# Patient Record
Sex: MALE | Race: WHITE | NOT HISPANIC OR LATINO | URBAN - METROPOLITAN AREA
[De-identification: names, ages, dates, MRNs, and addresses within clinical notes are randomized per-mention and may not be internally consistent; named-entity substitution may affect disease eponyms.]

---

## 2022-04-09 ENCOUNTER — EMERGENCY (EMERGENCY)
Facility: HOSPITAL | Age: 57
LOS: 1 days | Discharge: HOME | End: 2022-04-09
Attending: EMERGENCY MEDICINE | Admitting: EMERGENCY MEDICINE
Payer: COMMERCIAL

## 2022-04-09 VITALS
DIASTOLIC BLOOD PRESSURE: 67 MMHG | HEART RATE: 78 BPM | OXYGEN SATURATION: 99 % | RESPIRATION RATE: 16 BRPM | SYSTOLIC BLOOD PRESSURE: 134 MMHG | TEMPERATURE: 98 F

## 2022-04-09 VITALS
WEIGHT: 210.1 LBS | DIASTOLIC BLOOD PRESSURE: 78 MMHG | HEIGHT: 75 IN | HEART RATE: 84 BPM | RESPIRATION RATE: 17 BRPM | OXYGEN SATURATION: 97 % | TEMPERATURE: 98 F | SYSTOLIC BLOOD PRESSURE: 138 MMHG

## 2022-04-09 DIAGNOSIS — Z86.16 PERSONAL HISTORY OF COVID-19: ICD-10-CM

## 2022-04-09 DIAGNOSIS — R07.89 OTHER CHEST PAIN: ICD-10-CM

## 2022-04-09 DIAGNOSIS — E11.9 TYPE 2 DIABETES MELLITUS WITHOUT COMPLICATIONS: ICD-10-CM

## 2022-04-09 LAB
ALBUMIN SERPL ELPH-MCNC: 4 G/DL — SIGNIFICANT CHANGE UP (ref 3.3–5)
ALP SERPL-CCNC: 43 U/L — SIGNIFICANT CHANGE UP (ref 40–120)
ALT FLD-CCNC: 37 U/L — SIGNIFICANT CHANGE UP (ref 10–45)
ANION GAP SERPL CALC-SCNC: 10 MMOL/L — SIGNIFICANT CHANGE UP (ref 5–17)
APTT BLD: 36.1 SEC — HIGH (ref 27.5–35.5)
AST SERPL-CCNC: 34 U/L — SIGNIFICANT CHANGE UP (ref 10–40)
BASOPHILS # BLD AUTO: 0.04 K/UL — SIGNIFICANT CHANGE UP (ref 0–0.2)
BASOPHILS NFR BLD AUTO: 0.6 % — SIGNIFICANT CHANGE UP (ref 0–2)
BILIRUB SERPL-MCNC: 0.5 MG/DL — SIGNIFICANT CHANGE UP (ref 0.2–1.2)
BUN SERPL-MCNC: 13 MG/DL — SIGNIFICANT CHANGE UP (ref 7–23)
CALCIUM SERPL-MCNC: 9.2 MG/DL — SIGNIFICANT CHANGE UP (ref 8.4–10.5)
CHLORIDE SERPL-SCNC: 104 MMOL/L — SIGNIFICANT CHANGE UP (ref 96–108)
CK MB CFR SERPL CALC: 4.3 NG/ML — SIGNIFICANT CHANGE UP (ref 0–6.7)
CK SERPL-CCNC: 207 U/L — HIGH (ref 30–200)
CO2 SERPL-SCNC: 25 MMOL/L — SIGNIFICANT CHANGE UP (ref 22–31)
CREAT SERPL-MCNC: 0.9 MG/DL — SIGNIFICANT CHANGE UP (ref 0.5–1.3)
EGFR: 101 ML/MIN/1.73M2 — SIGNIFICANT CHANGE UP
EOSINOPHIL # BLD AUTO: 0.16 K/UL — SIGNIFICANT CHANGE UP (ref 0–0.5)
EOSINOPHIL NFR BLD AUTO: 2.4 % — SIGNIFICANT CHANGE UP (ref 0–6)
GLUCOSE SERPL-MCNC: 154 MG/DL — HIGH (ref 70–99)
HCT VFR BLD CALC: 45.3 % — SIGNIFICANT CHANGE UP (ref 39–50)
HGB BLD-MCNC: 15.8 G/DL — SIGNIFICANT CHANGE UP (ref 13–17)
IMM GRANULOCYTES NFR BLD AUTO: 0.3 % — SIGNIFICANT CHANGE UP (ref 0–1.5)
INR BLD: 1.06 — SIGNIFICANT CHANGE UP (ref 0.88–1.16)
LYMPHOCYTES # BLD AUTO: 1.42 K/UL — SIGNIFICANT CHANGE UP (ref 1–3.3)
LYMPHOCYTES # BLD AUTO: 21.3 % — SIGNIFICANT CHANGE UP (ref 13–44)
MCHC RBC-ENTMCNC: 32.2 PG — SIGNIFICANT CHANGE UP (ref 27–34)
MCHC RBC-ENTMCNC: 34.9 GM/DL — SIGNIFICANT CHANGE UP (ref 32–36)
MCV RBC AUTO: 92.3 FL — SIGNIFICANT CHANGE UP (ref 80–100)
MONOCYTES # BLD AUTO: 0.68 K/UL — SIGNIFICANT CHANGE UP (ref 0–0.9)
MONOCYTES NFR BLD AUTO: 10.2 % — SIGNIFICANT CHANGE UP (ref 2–14)
NEUTROPHILS # BLD AUTO: 4.34 K/UL — SIGNIFICANT CHANGE UP (ref 1.8–7.4)
NEUTROPHILS NFR BLD AUTO: 65.2 % — SIGNIFICANT CHANGE UP (ref 43–77)
NRBC # BLD: 0 /100 WBCS — SIGNIFICANT CHANGE UP (ref 0–0)
PLATELET # BLD AUTO: 247 K/UL — SIGNIFICANT CHANGE UP (ref 150–400)
POTASSIUM SERPL-MCNC: 4.3 MMOL/L — SIGNIFICANT CHANGE UP (ref 3.5–5.3)
POTASSIUM SERPL-SCNC: 4.3 MMOL/L — SIGNIFICANT CHANGE UP (ref 3.5–5.3)
PROT SERPL-MCNC: 7 G/DL — SIGNIFICANT CHANGE UP (ref 6–8.3)
PROTHROM AB SERPL-ACNC: 12.6 SEC — SIGNIFICANT CHANGE UP (ref 10.5–13.4)
RBC # BLD: 4.91 M/UL — SIGNIFICANT CHANGE UP (ref 4.2–5.8)
RBC # FLD: 13.3 % — SIGNIFICANT CHANGE UP (ref 10.3–14.5)
SODIUM SERPL-SCNC: 139 MMOL/L — SIGNIFICANT CHANGE UP (ref 135–145)
TROPONIN T SERPL-MCNC: 0.01 NG/ML — SIGNIFICANT CHANGE UP (ref 0–0.01)
WBC # BLD: 6.66 K/UL — SIGNIFICANT CHANGE UP (ref 3.8–10.5)
WBC # FLD AUTO: 6.66 K/UL — SIGNIFICANT CHANGE UP (ref 3.8–10.5)

## 2022-04-09 PROCEDURE — 80053 COMPREHEN METABOLIC PANEL: CPT

## 2022-04-09 PROCEDURE — 84484 ASSAY OF TROPONIN QUANT: CPT

## 2022-04-09 PROCEDURE — 85025 COMPLETE CBC W/AUTO DIFF WBC: CPT

## 2022-04-09 PROCEDURE — 82553 CREATINE MB FRACTION: CPT

## 2022-04-09 PROCEDURE — 93005 ELECTROCARDIOGRAM TRACING: CPT

## 2022-04-09 PROCEDURE — 71046 X-RAY EXAM CHEST 2 VIEWS: CPT | Mod: 26

## 2022-04-09 PROCEDURE — 36415 COLL VENOUS BLD VENIPUNCTURE: CPT

## 2022-04-09 PROCEDURE — 99285 EMERGENCY DEPT VISIT HI MDM: CPT | Mod: 25

## 2022-04-09 PROCEDURE — 93010 ELECTROCARDIOGRAM REPORT: CPT

## 2022-04-09 PROCEDURE — 85610 PROTHROMBIN TIME: CPT

## 2022-04-09 PROCEDURE — 82962 GLUCOSE BLOOD TEST: CPT

## 2022-04-09 PROCEDURE — 82550 ASSAY OF CK (CPK): CPT

## 2022-04-09 PROCEDURE — 85379 FIBRIN DEGRADATION QUANT: CPT

## 2022-04-09 PROCEDURE — 71046 X-RAY EXAM CHEST 2 VIEWS: CPT

## 2022-04-09 PROCEDURE — 85730 THROMBOPLASTIN TIME PARTIAL: CPT

## 2022-04-09 NOTE — ED PROVIDER NOTE - OBJECTIVE STATEMENT
56M nonsmoker, iddm, prior covid19 infection not req hospitalization (12/2021), completed covid19 vaccination, c/o 1w recurrent nightly nonradiating nonpositional nonpleuritic nonexertional L chest tightness and occasional R thigh "twinges." recently returned from [place] (1.5h flight) prior to sx onset. no fever/chills, no ha/dizziness, no uri/cough, no sob, no palpitations, no abd pain/n/v, no diarrhea, no hematochezia/melena, no change in appetite/po intake, no dysuria, no pedal edema/pain/rash, no trauma, no etoh-dpt/ivdu, no phx/fhx acs/mi vs dvt/pe.

## 2022-04-09 NOTE — ED PROVIDER NOTE - PATIENT PORTAL LINK FT
You can access the FollowMyHealth Patient Portal offered by [facility] by registering at the following website: [email or web address]. By joining [employer]’s FollowMyHealth portal, you will also be able to view your health information using other applications (apps) compatible with our system.

## 2022-04-09 NOTE — ED PROVIDER NOTE - NSFOLLOWUPINSTRUCTIONS_ED_ALL_ED_FT
PLEASE FOLLOW-UP WITH CARDIOLOGY IN 1-2 DAYS.    ANY XRAY IMAGING RESULTS GIVEN IN THE EMERGENCY DEPARTMENT ARE PRELIMINARY UNTIL FORMALLY READ BY A RADIOLOGIST. YOU WILL BE CONTACTED IF THERE ARE ANY SIGNIFICANT CHANGES IN THE FINAL READ.    PLEASE RETURN TO THE EMERGENCY DEPARTMENT IF FEVER, CHEST PAIN, SHORTNESS OF BREATH, ABDOMINAL PAIN, VOMITING, OTHER CONCERNING SYMPTOMS.    PLEASE CONTACT DARNELL OROZCO ([facility] EMERGENCY DEPARTMENT CLINICAL REFERRAL COORDINATOR) TO ASSIST IN SCHEDULING YOUR FOLLOW-UP APPOINTMENT.    Monday - Friday 11am-7pm  [phone]  [email or web address]                    CHEST PAIN - Discharge Care           Chest Pain    WHAT YOU NEED TO KNOW:    Chest pain can be caused by a range of conditions, from not serious to life-threatening. Chest pain can be a symptom of a digestive problem, such as acid reflux or a stomach ulcer. An anxiety attack or a strong emotion, such as anger, can also cause chest pain. Infection, inflammation, or a fracture in the bones or cartilage in your chest can cause pain or discomfort. Sometimes chest pain or pressure is caused by poor blood flow to your heart (angina). Chest pain may also be caused by life-threatening conditions such as a heart attack or blood clot in your lungs.    DISCHARGE INSTRUCTIONS:    Call your local emergency number (911 in the [place]) or have someone call if:   •You have any of the following signs of a heart attack: ?Squeezing, pressure, or pain in your chest      ?You may also have any of the following: ?Discomfort or pain in your back, neck, jaw, stomach, or arm      ?Shortness of breath      ?Nausea or vomiting      ?Lightheadedness or a sudden cold sweat            Seek care immediately if:   •You have chest discomfort that gets worse, even with medicine.      •You cough or vomit blood.      •Your bowel movements are black or bloody.      •You cannot stop vomiting, or it hurts to swallow.      Call your doctor if:   •You have questions or concerns about your condition or care.          Medicines:   •Medicines may be given to treat the cause of your chest pain. Examples include pain medicine, anxiety medicine, or medicines to increase blood flow to your heart.      •Do not take certain medicines without asking your healthcare provider first. These include NSAIDs, herbal or vitamin supplements, and hormones, such as estrogen or progestin.      •Take your medicine as directed. Contact your healthcare provider if you think your medicine is not helping or if you have side effects. Tell him or her if you are allergic to any medicine. Keep a list of the medicines, vitamins, and herbs you take. Include the amounts, and when and why you take them. Bring the list or the pill bottles to follow-up visits. Carry your medicine list with you in case of an emergency.      Healthy living tips: If the cause of your chest pain is known, your healthcare provider will give you specific guidelines to follow. The following are general healthy guidelines:  •Do not smoke. Nicotine and other chemicals in cigarettes and cigars can cause lung and heart damage. Ask your healthcare provider for information if you currently smoke and need help to quit. E-cigarettes or smokeless tobacco still contain nicotine. Talk to your healthcare provider before you use these products.      •Choose a variety of healthy foods as often as possible. Include fresh, frozen, or canned fruits and vegetables. Also include low-fat dairy products, fish, chicken (without skin), and lean meats. Your healthcare provider or a dietitian can help you create meal plans. You may need to avoid certain foods or drinks if your pain is caused by a digestion problem.  Healthy Foods           •Lower your sodium (salt) intake. Limit foods that are high in sodium, such as canned foods, salty snacks, and cold cuts. If you add salt when you cook food, do not add more at the table. Choose low-sodium canned foods as much as possible.             •Drink plenty of water every day. Water helps your body to control temperature and blood pressure. Ask your healthcare provider how much water you should drink every day.      •Ask about activity. Your healthcare provider will tell you which activities to limit or avoid. Ask when you can drive, return to work, and have sex. Ask about the best exercise plan for you.      •Maintain a healthy weight. Ask your healthcare provider what a healthy weight is for you. Ask him or her to help you create a safe weight loss plan if you are overweight.      •Ask about vaccines you may need. Your healthcare provider can tell you which vaccines you need, and when to get them. The following vaccines help prevent diseases that can become serious for a person with a heart condition:?The influenza (flu) vaccine is given each year. Get a flu vaccine as soon as recommended, usually in September or October.      ?The pneumonia vaccine is usually given every 5 years. Your healthcare provider may recommend the pneumonia vaccine if you are 65 or older.      ?COVID-19 vaccines are given to adults as a shot in 1 or 2 doses.   Vaccination is recommended for all adults. A booster (additional) dose is also recommended to help your immune system continue to protect against severe COVID-19. The booster can be a different brand of the COVID-19 vaccine than you originally received. The timing for the booster depends on the type of vaccine you received:?1-dose vaccine: The booster is given at least 2 months after you received the vaccine.      ?2-dose vaccine: The booster is given at least 5 to 6 months after the second dose.         Prevent Heart Disease          Follow up with your doctor within 72 hours, or as directed: You may need to return for more tests to find the cause of your chest pain. You may be referred to a specialist, such as a cardiologist or gastroenterologist. Write down your questions so you remember to ask them during your visits.       © Copyright [employer] 2022           back to top                          © Copyright [employer] 2022

## 2022-04-09 NOTE — ED PROVIDER NOTE - CLINICAL SUMMARY MEDICAL DECISION MAKING FREE TEXT BOX
avss. nontoxic. NAD. no systemic sx. no active cp. no acute resp distress. no leukocytosis vs significant anemia vs electrolyte abnl. trop wnl. ekg w/o significant st/t changes. cxr w/o acute focal consol vs ptx vs pulm edema vs widened mediastinum. low risk by wells. ddimer neg. heart score 2. no indication for inpatient hospitalization at this time. will dc w/ outpatient cards fu. strict return precautions. pt agrees w/ plan. questions answered.

## 2022-04-09 NOTE — ED ADULT NURSE NOTE - OBJECTIVE STATEMENT
pt received into spot 17 A&Ox4 amb appears comfortable arrives for eval of intermittent chest pressure and left leg pain x 1 week no sob respirations unlabored b/l radial pulses 12 lead ekg done iv placed labs sent

## 2022-04-09 NOTE — ED PROVIDER NOTE - CARE PROVIDER_API CALL
Mumtaz Trevino)  Internal Medicine  [address], [address] [place] [address], [place] [address]  Phone: [phone]  Fax: [phone]  Follow Up Time: Urgent

## 2022-04-12 PROBLEM — Z00.00 ENCOUNTER FOR PREVENTIVE HEALTH EXAMINATION: Status: ACTIVE | Noted: 2022-04-12

## 2022-04-24 ENCOUNTER — TRANSCRIPTION ENCOUNTER (OUTPATIENT)
Age: 57
End: 2022-04-24

## 2022-05-05 ENCOUNTER — APPOINTMENT (OUTPATIENT)
Dept: HEART AND VASCULAR | Facility: CLINIC | Age: 57
End: 2022-05-05
Payer: COMMERCIAL

## 2022-05-05 VITALS
TEMPERATURE: 98.7 F | RESPIRATION RATE: 14 BRPM | SYSTOLIC BLOOD PRESSURE: 134 MMHG | DIASTOLIC BLOOD PRESSURE: 76 MMHG | HEART RATE: 82 BPM | WEIGHT: 205 LBS | HEIGHT: 75 IN | BODY MASS INDEX: 25.49 KG/M2 | OXYGEN SATURATION: 98 %

## 2022-05-05 DIAGNOSIS — Z78.9 OTHER SPECIFIED HEALTH STATUS: ICD-10-CM

## 2022-05-05 DIAGNOSIS — Z82.3 FAMILY HISTORY OF STROKE: ICD-10-CM

## 2022-05-05 DIAGNOSIS — R07.9 CHEST PAIN, UNSPECIFIED: ICD-10-CM

## 2022-05-05 PROCEDURE — 99204 OFFICE O/P NEW MOD 45 MIN: CPT

## 2022-05-05 NOTE — HISTORY OF PRESENT ILLNESS
[FreeTextEntry1] : here after ER eval for cp\par enzymes and ekg ok\par advised outpt eval\par iddm since age 18\par cp is sharp, central, lasts minutes\par no dyspnea\par ok when he walks/bikes\par has never done stress test

## 2022-05-05 NOTE — DISCUSSION/SUMMARY
[FreeTextEntry1] : cp seen in ER, long standing IDDM\par referred for CCTA\par advised empiric statin, arb- he will discuss with his endocrinologist/pcp

## 2022-06-01 ENCOUNTER — TRANSCRIPTION ENCOUNTER (OUTPATIENT)
Age: 57
End: 2022-06-01

## 2022-06-02 ENCOUNTER — OUTPATIENT (OUTPATIENT)
Dept: OUTPATIENT SERVICES | Facility: HOSPITAL | Age: 57
LOS: 1 days | End: 2022-06-02

## 2022-06-02 ENCOUNTER — RESULT REVIEW (OUTPATIENT)
Age: 57
End: 2022-06-02

## 2022-06-02 ENCOUNTER — APPOINTMENT (OUTPATIENT)
Dept: CT IMAGING | Facility: CLINIC | Age: 57
End: 2022-06-02
Payer: COMMERCIAL

## 2022-06-02 PROCEDURE — 75574 CT ANGIO HRT W/3D IMAGE: CPT | Mod: 26

## 2022-06-07 ENCOUNTER — TRANSCRIPTION ENCOUNTER (OUTPATIENT)
Age: 57
End: 2022-06-07

## 2022-07-04 ENCOUNTER — NON-APPOINTMENT (OUTPATIENT)
Age: 57
End: 2022-07-04

## 2022-07-05 ENCOUNTER — APPOINTMENT (OUTPATIENT)
Dept: PULMONOLOGY | Facility: CLINIC | Age: 57
End: 2022-07-05

## 2022-07-05 VITALS
DIASTOLIC BLOOD PRESSURE: 73 MMHG | WEIGHT: 205 LBS | OXYGEN SATURATION: 98 % | TEMPERATURE: 97.3 F | HEART RATE: 76 BPM | SYSTOLIC BLOOD PRESSURE: 116 MMHG | BODY MASS INDEX: 25.49 KG/M2 | HEIGHT: 75 IN

## 2022-07-05 PROCEDURE — 99204 OFFICE O/P NEW MOD 45 MIN: CPT

## 2022-07-05 RX ORDER — PEN NEEDLE, DIABETIC 32GX 5/32"
32G X 4 MM NEEDLE, DISPOSABLE MISCELLANEOUS
Qty: 200 | Refills: 0 | Status: ACTIVE | COMMUNITY
Start: 2022-02-03

## 2022-07-05 RX ORDER — INSULIN LISPRO 100 [IU]/ML
100 INJECTION, SOLUTION INTRAVENOUS; SUBCUTANEOUS
Qty: 15 | Refills: 0 | Status: ACTIVE | COMMUNITY
Start: 2022-02-03

## 2022-07-05 RX ORDER — INSULIN LISPRO 100 [IU]/ML
100 INJECTION, SOLUTION INTRAVENOUS; SUBCUTANEOUS
Qty: 10 | Refills: 0 | Status: ACTIVE | COMMUNITY
Start: 2021-08-13

## 2022-07-05 RX ORDER — BLOOD SUGAR DIAGNOSTIC
STRIP MISCELLANEOUS
Qty: 300 | Refills: 0 | Status: ACTIVE | COMMUNITY
Start: 2022-02-03

## 2022-07-05 RX ORDER — GLUCAGON 1 MG
1 KIT INJECTION
Qty: 1 | Refills: 0 | Status: ACTIVE | COMMUNITY
Start: 2022-02-03

## 2022-08-15 ENCOUNTER — RESULT REVIEW (OUTPATIENT)
Age: 57
End: 2022-08-15

## 2022-08-15 ENCOUNTER — APPOINTMENT (OUTPATIENT)
Dept: CT IMAGING | Facility: CLINIC | Age: 57
End: 2022-08-15

## 2022-08-15 ENCOUNTER — OUTPATIENT (OUTPATIENT)
Dept: OUTPATIENT SERVICES | Facility: HOSPITAL | Age: 57
LOS: 1 days | End: 2022-08-15

## 2022-08-15 PROCEDURE — 71250 CT THORAX DX C-: CPT | Mod: 26

## 2022-08-17 NOTE — HISTORY OF PRESENT ILLNESS
[TextBox_4] : 07/05/2022: Asked to evaluate patient by Dr You for lung nodules. This was an incidental finding on a cardiac scan as part of a workup for palpitations. No dyspnea - just if for instance running up stairs. No history of lung disease. Never smoker. . [place] and now lives [place]. No history of chest infection or malignancy. Had Covid in Jan, is vaxxed. No cause for the palpitations was determined, and they stopped after he saw Dr You. No rash, joint symptoms, no change in vision. IDDM under control.\par  [ESS] : 4

## 2022-08-17 NOTE — CONSULT LETTER
[( Thank you for referring [unfilled] for consultation for _____ )] : Thank you for referring [unfilled] for consultation for [unfilled] [Please see my note below.] : Please see my note below. [Consult Closing:] : Thank you very much for allowing me to participate in the care of this patient.  If you have any questions, please do not hesitate to contact me. [Sincerely,] : Sincerely, [FreeTextEntry2] : Madan You MD\par [address] [place] [address]\par [place], [place] [address]\par  [FreeTextEntry3] : Angelia Sultana MD, FCCP\par

## 2022-08-17 NOTE — ASSESSMENT
[FreeTextEntry1] : Data reviewed:\par \par CTA cardiac [facility] 6/2022 personally reviewed : there are some tiny ground glass nodules and small calcified nodes\par \par Impression:\par Incidental micronodules on scan w calcified nodes\par Asymptomatic, never smoker\par \par Plan:\par Significance of these findings is uncertain in this asymptomatic nonsmoker but the ddx would include sarcoidosis, which would invoke the possibility that his cardiac symptoms are also due to sarcoid. Will get diagnostic CT chest and then determine if further workup is needed.\par --\par CT chest [facility] 8/2022 personally reviewed - innumerable tiny nodules and mediastinal adenopathy / spoke to him, rec bronch to exclude sarcoid, he is agreeable but needs to sort out his schedule, risks/benefits reviewed and he will call when he is ready to schedule

## 2023-09-18 ENCOUNTER — EMERGENCY (EMERGENCY)
Facility: HOSPITAL | Age: 58
LOS: 1 days | Discharge: HOME | End: 2023-09-18
Attending: STUDENT IN AN ORGANIZED HEALTH CARE EDUCATION/TRAINING PROGRAM | Admitting: STUDENT IN AN ORGANIZED HEALTH CARE EDUCATION/TRAINING PROGRAM
Payer: COMMERCIAL

## 2023-09-18 VITALS
TEMPERATURE: 98 F | HEIGHT: 75 IN | RESPIRATION RATE: 18 BRPM | DIASTOLIC BLOOD PRESSURE: 78 MMHG | HEART RATE: 77 BPM | SYSTOLIC BLOOD PRESSURE: 148 MMHG | OXYGEN SATURATION: 97 % | WEIGHT: 220.02 LBS

## 2023-09-18 DIAGNOSIS — R07.89 OTHER CHEST PAIN: ICD-10-CM

## 2023-09-18 DIAGNOSIS — R00.2 PALPITATIONS: ICD-10-CM

## 2023-09-18 PROCEDURE — 99285 EMERGENCY DEPT VISIT HI MDM: CPT

## 2023-09-19 VITALS
OXYGEN SATURATION: 96 % | TEMPERATURE: 98 F | DIASTOLIC BLOOD PRESSURE: 86 MMHG | SYSTOLIC BLOOD PRESSURE: 132 MMHG | HEART RATE: 67 BPM | RESPIRATION RATE: 18 BRPM

## 2023-09-19 LAB
ALBUMIN SERPL ELPH-MCNC: 4 G/DL — SIGNIFICANT CHANGE UP (ref 3.3–5)
ALP SERPL-CCNC: 39 U/L — LOW (ref 40–120)
ALT FLD-CCNC: 42 U/L — SIGNIFICANT CHANGE UP (ref 10–45)
ANION GAP SERPL CALC-SCNC: 11 MMOL/L — SIGNIFICANT CHANGE UP (ref 5–17)
AST SERPL-CCNC: 42 U/L — HIGH (ref 10–40)
BASOPHILS # BLD AUTO: 0.03 K/UL — SIGNIFICANT CHANGE UP (ref 0–0.2)
BASOPHILS NFR BLD AUTO: 0.4 % — SIGNIFICANT CHANGE UP (ref 0–2)
BILIRUB SERPL-MCNC: 0.5 MG/DL — SIGNIFICANT CHANGE UP (ref 0.2–1.2)
BUN SERPL-MCNC: 12 MG/DL — SIGNIFICANT CHANGE UP (ref 7–23)
CALCIUM SERPL-MCNC: 9.2 MG/DL — SIGNIFICANT CHANGE UP (ref 8.4–10.5)
CHLORIDE SERPL-SCNC: 103 MMOL/L — SIGNIFICANT CHANGE UP (ref 96–108)
CO2 SERPL-SCNC: 24 MMOL/L — SIGNIFICANT CHANGE UP (ref 22–31)
CREAT SERPL-MCNC: 0.72 MG/DL — SIGNIFICANT CHANGE UP (ref 0.5–1.3)
EGFR: 107 ML/MIN/1.73M2 — SIGNIFICANT CHANGE UP
EOSINOPHIL # BLD AUTO: 0.3 K/UL — SIGNIFICANT CHANGE UP (ref 0–0.5)
EOSINOPHIL NFR BLD AUTO: 4.1 % — SIGNIFICANT CHANGE UP (ref 0–6)
GLUCOSE SERPL-MCNC: 113 MG/DL — HIGH (ref 70–99)
HCT VFR BLD CALC: 43 % — SIGNIFICANT CHANGE UP (ref 39–50)
HGB BLD-MCNC: 15.2 G/DL — SIGNIFICANT CHANGE UP (ref 13–17)
IMM GRANULOCYTES NFR BLD AUTO: 0.3 % — SIGNIFICANT CHANGE UP (ref 0–0.9)
LYMPHOCYTES # BLD AUTO: 2.09 K/UL — SIGNIFICANT CHANGE UP (ref 1–3.3)
LYMPHOCYTES # BLD AUTO: 28.6 % — SIGNIFICANT CHANGE UP (ref 13–44)
MCHC RBC-ENTMCNC: 32.7 PG — SIGNIFICANT CHANGE UP (ref 27–34)
MCHC RBC-ENTMCNC: 35.3 GM/DL — SIGNIFICANT CHANGE UP (ref 32–36)
MCV RBC AUTO: 92.5 FL — SIGNIFICANT CHANGE UP (ref 80–100)
MONOCYTES # BLD AUTO: 0.85 K/UL — SIGNIFICANT CHANGE UP (ref 0–0.9)
MONOCYTES NFR BLD AUTO: 11.6 % — SIGNIFICANT CHANGE UP (ref 2–14)
NEUTROPHILS # BLD AUTO: 4.03 K/UL — SIGNIFICANT CHANGE UP (ref 1.8–7.4)
NEUTROPHILS NFR BLD AUTO: 55 % — SIGNIFICANT CHANGE UP (ref 43–77)
NRBC # BLD: 0 /100 WBCS — SIGNIFICANT CHANGE UP (ref 0–0)
PLATELET # BLD AUTO: 195 K/UL — SIGNIFICANT CHANGE UP (ref 150–400)
POTASSIUM SERPL-MCNC: 4 MMOL/L — SIGNIFICANT CHANGE UP (ref 3.5–5.3)
POTASSIUM SERPL-SCNC: 4 MMOL/L — SIGNIFICANT CHANGE UP (ref 3.5–5.3)
PROT SERPL-MCNC: 6.8 G/DL — SIGNIFICANT CHANGE UP (ref 6–8.3)
RBC # BLD: 4.65 M/UL — SIGNIFICANT CHANGE UP (ref 4.2–5.8)
RBC # FLD: 12.8 % — SIGNIFICANT CHANGE UP (ref 10.3–14.5)
SODIUM SERPL-SCNC: 138 MMOL/L — SIGNIFICANT CHANGE UP (ref 135–145)
TROPONIN T, HIGH SENSITIVITY RESULT: 8 NG/L — SIGNIFICANT CHANGE UP (ref 0–51)
WBC # BLD: 7.32 K/UL — SIGNIFICANT CHANGE UP (ref 3.8–10.5)
WBC # FLD AUTO: 7.32 K/UL — SIGNIFICANT CHANGE UP (ref 3.8–10.5)

## 2023-09-19 PROCEDURE — 99285 EMERGENCY DEPT VISIT HI MDM: CPT | Mod: 25

## 2023-09-19 PROCEDURE — 80053 COMPREHEN METABOLIC PANEL: CPT

## 2023-09-19 PROCEDURE — 71045 X-RAY EXAM CHEST 1 VIEW: CPT | Mod: 26

## 2023-09-19 PROCEDURE — 84484 ASSAY OF TROPONIN QUANT: CPT

## 2023-09-19 PROCEDURE — 85025 COMPLETE CBC W/AUTO DIFF WBC: CPT

## 2023-09-19 PROCEDURE — 36415 COLL VENOUS BLD VENIPUNCTURE: CPT

## 2023-09-19 PROCEDURE — 71045 X-RAY EXAM CHEST 1 VIEW: CPT

## 2023-09-19 NOTE — ED PROVIDER NOTE - INTERNATIONAL TRAVEL
08/08/22 1512   Safe Environment   Safety Measures   (virtual safety rounds complete)   Virtual nurse rounds, patient sitting on edge of bed. Denies pain or needs at this time. No safety concerns identified. Call light within reach. Will continue to monitor. No

## 2023-09-19 NOTE — ED PROVIDER NOTE - PATIENT PORTAL LINK FT
You can access the FollowMyHealth Patient Portal offered by [facility] by registering at the following website: [email or web address]. By joining [employer]’s FollowMyHealth portal, you will also be able to view your health information using other applications (apps) compatible with our system.

## 2023-09-19 NOTE — ED PROVIDER NOTE - PHYSICAL EXAMINATION
General: Awake, alert and oriented. No acute distress. Well developed, hydrated and nourished. Appears stated age.  Skin: Skin in warm, dry and intact without rashes or lesions. Appropriate color for ethnicity  HENMT: head normocephalic and atraumatic; bilateral external ears without swelling. no nasal discharge. moist oral mucosa. supple neck, trachea midline  EYES: Conjunctiva clear. nonicteric sclera. EOM intact, Eyelids are normal in appearance without swelling or lesions.  Cardiac: well perfused, s1, s2, rrr  Respiratory: breathing comfortably on room air. no audible wheezing or stridor, lungs ctab, no chest wall tenderness to palpation  Abdominal: nondistended, soft, nontender  MSK: Neck and back are without deformity, visible external skin changes, or signs of trauma. Curvature of the cervical, thoracic, and lumbar spine are within normal limits. no external signs of trauma. no apparent deficits in ROM of any extremity. no leg swelling or tenderness  Neurological: The patient is awake, alert and oriented to person, place, and time with normal speech. CN 2-12 grossly intact. no apparent deficits. Memory is normal and thought process is intact.  Psychiatric: Appropriate mood and affect. Good judgement and insight.

## 2023-09-19 NOTE — ED PROVIDER NOTE - CLINICAL SUMMARY MEDICAL DECISION MAKING FREE TEXT BOX
The patient's risk factors for ACS were reviewed as well as the EKG.  cxr negative for Pneumonia, Pneumothorax, Esophageal Tears.  no clinical signs of DVT, PERC/wells criteria not indicative of PE.  There are no signs of Pericarditis, Endocarditis, or Myocarditis based on risk factor analysis.  There is no fever.  There does not appear to be an Aortic Dissection either based on history, physical exam, and signs. ekg and troponin wnl. will dc

## 2023-09-19 NOTE — ED ADULT NURSE NOTE - OBJECTIVE STATEMENT
Pt presents ambulatory from triage with c/o "chest pain", non radiating, localized to left anterior chest wall since yesterday. Pt reports onset of pain noted while "working on computer", was "light at first", then repors pain has continued causing him to not be able to go to sleep tonight. Denies any recent fevers. Hx of DM (has insulin pump). Denies any palpitations, nausea SOB, diaphoresis or other associated s/s. Respiratory effort WNL. EKG done in triage and reviewed by ER physician.

## 2023-09-19 NOTE — ED ADULT NURSE NOTE - SKIN TURGOR
Vital Signs Last 24 Hrs  T(C): 35.9 (27 Jul 2020 09:09), Max: 35.9 (27 Jul 2020 09:09)  T(F): 96.7 (27 Jul 2020 09:09), Max: 96.7 (27 Jul 2020 09:09)  HR: 101 (27 Jul 2020 09:09) (101 - 101)  BP: 128/83 (27 Jul 2020 09:09) (128/83 - 128/83)  BP(mean): --  RR: 18 (27 Jul 2020 09:09) (18 - 18)  SpO2: 98% (27 Jul 2020 09:09) (98% - 98%)    Gen: NAD  Pulm: normal resp effort and excursion  Abd: soft, NT/ND resilient/elastic

## 2023-09-19 NOTE — ED PROVIDER NOTE - OBJECTIVE STATEMENT
58m hx DM. several days of left sided chest discomfort, assoc with palpitations. pain is intermittent, nonexertional. when it comes he feels stiffness/soreness of the muscles of his left side/arm. no sob. otherwis ein usual state of health. had similar symptoms several years ago, saw a cardiologist at that time, does not know what workup performed, but was told everything was normal.

## 2023-09-19 NOTE — ED PROVIDER NOTE - CARE PROVIDER_API CALL
Stephanie Cortez)  Cardiovascular Disease; Internal Medicine  [address], [address]  [place], [place] [address]  Phone: [phone]  Fax: [phone]  Follow Up Time:

## 2023-10-17 ENCOUNTER — APPOINTMENT (OUTPATIENT)
Dept: HEART AND VASCULAR | Facility: CLINIC | Age: 58
End: 2023-10-17
Payer: COMMERCIAL

## 2023-10-17 VITALS
HEART RATE: 79 BPM | OXYGEN SATURATION: 96 % | BODY MASS INDEX: 27.98 KG/M2 | WEIGHT: 225 LBS | DIASTOLIC BLOOD PRESSURE: 74 MMHG | SYSTOLIC BLOOD PRESSURE: 123 MMHG | HEIGHT: 75 IN

## 2023-10-17 DIAGNOSIS — E66.3 OVERWEIGHT: ICD-10-CM

## 2023-10-17 PROCEDURE — 36415 COLL VENOUS BLD VENIPUNCTURE: CPT

## 2023-10-17 PROCEDURE — 99215 OFFICE O/P EST HI 40 MIN: CPT

## 2023-10-17 PROCEDURE — 93000 ELECTROCARDIOGRAM COMPLETE: CPT

## 2023-10-18 LAB
ALBUMIN SERPL ELPH-MCNC: 4.2 G/DL
ALP BLD-CCNC: 43 U/L
ALT SERPL-CCNC: 41 U/L
ANION GAP SERPL CALC-SCNC: 12 MMOL/L
APPEARANCE: CLEAR
AST SERPL-CCNC: 44 U/L
BACTERIA: NEGATIVE /HPF
BILIRUB SERPL-MCNC: 1 MG/DL
BILIRUBIN URINE: NEGATIVE
BLOOD URINE: NEGATIVE
BUN SERPL-MCNC: 14 MG/DL
CALCIUM SERPL-MCNC: 9.5 MG/DL
CAST: 0 /LPF
CHLORIDE SERPL-SCNC: 103 MMOL/L
CHOLEST SERPL-MCNC: 159 MG/DL
CO2 SERPL-SCNC: 23 MMOL/L
COLOR: YELLOW
CREAT SERPL-MCNC: 0.83 MG/DL
EGFR: 103 ML/MIN/1.73M2
EPITHELIAL CELLS: 0 /HPF
ESTIMATED AVERAGE GLUCOSE: 128 MG/DL
GLUCOSE QUALITATIVE U: >=1000 MG/DL
GLUCOSE SERPL-MCNC: 173 MG/DL
HBA1C MFR BLD HPLC: 6.1 %
HDLC SERPL-MCNC: 82 MG/DL
KETONES URINE: ABNORMAL MG/DL
LDLC SERPL CALC-MCNC: 63 MG/DL
LEUKOCYTE ESTERASE URINE: NEGATIVE
MAGNESIUM SERPL-MCNC: 1.9 MG/DL
MICROSCOPIC-UA: NORMAL
NITRITE URINE: NEGATIVE
NONHDLC SERPL-MCNC: 77 MG/DL
PH URINE: 6
POTASSIUM SERPL-SCNC: 4 MMOL/L
PROT SERPL-MCNC: 6.9 G/DL
PROTEIN URINE: NEGATIVE MG/DL
RED BLOOD CELLS URINE: 1 /HPF
SODIUM SERPL-SCNC: 139 MMOL/L
SPECIFIC GRAVITY URINE: 1.03
TRIGL SERPL-MCNC: 75 MG/DL
TSH SERPL-ACNC: 2.1 UIU/ML
UROBILINOGEN URINE: 1 MG/DL
WHITE BLOOD CELLS URINE: 0 /HPF

## 2023-10-19 ENCOUNTER — NON-APPOINTMENT (OUTPATIENT)
Age: 58
End: 2023-10-19

## 2023-10-19 LAB — APO LP(A) SERPL-MCNC: 151.2 NMOL/L

## 2023-10-23 ENCOUNTER — TRANSCRIPTION ENCOUNTER (OUTPATIENT)
Age: 58
End: 2023-10-23

## 2023-11-07 ENCOUNTER — APPOINTMENT (OUTPATIENT)
Dept: HEART AND VASCULAR | Facility: CLINIC | Age: 58
End: 2023-11-07
Payer: COMMERCIAL

## 2023-11-07 VITALS
BODY MASS INDEX: 27.98 KG/M2 | OXYGEN SATURATION: 97 % | HEART RATE: 77 BPM | HEIGHT: 75 IN | SYSTOLIC BLOOD PRESSURE: 143 MMHG | WEIGHT: 225 LBS | DIASTOLIC BLOOD PRESSURE: 83 MMHG

## 2023-11-07 DIAGNOSIS — I35.1 NONRHEUMATIC AORTIC (VALVE) INSUFFICIENCY: ICD-10-CM

## 2023-11-07 PROCEDURE — 93306 TTE W/DOPPLER COMPLETE: CPT

## 2023-11-08 PROBLEM — I35.1 AORTIC VALVE REGURGITATION, NONRHEUMATIC: Status: ACTIVE | Noted: 2023-11-08

## 2023-11-09 ENCOUNTER — NON-APPOINTMENT (OUTPATIENT)
Age: 58
End: 2023-11-09

## 2023-11-10 ENCOUNTER — TRANSCRIPTION ENCOUNTER (OUTPATIENT)
Age: 58
End: 2023-11-10

## 2023-12-19 ENCOUNTER — APPOINTMENT (OUTPATIENT)
Dept: HEART AND VASCULAR | Facility: CLINIC | Age: 58
End: 2023-12-19
Payer: COMMERCIAL

## 2023-12-19 ENCOUNTER — RX RENEWAL (OUTPATIENT)
Age: 58
End: 2023-12-19

## 2023-12-19 VITALS
HEIGHT: 75 IN | WEIGHT: 225 LBS | DIASTOLIC BLOOD PRESSURE: 81 MMHG | HEART RATE: 68 BPM | BODY MASS INDEX: 27.98 KG/M2 | SYSTOLIC BLOOD PRESSURE: 143 MMHG

## 2023-12-19 DIAGNOSIS — I10 ESSENTIAL (PRIMARY) HYPERTENSION: ICD-10-CM

## 2023-12-19 PROCEDURE — 99214 OFFICE O/P EST MOD 30 MIN: CPT

## 2023-12-19 RX ORDER — INSULIN GLARGINE 100 [IU]/ML
100 INJECTION, SOLUTION SUBCUTANEOUS
Qty: 9 | Refills: 0 | Status: DISCONTINUED | COMMUNITY
Start: 2022-02-03 | End: 2023-12-19

## 2023-12-19 NOTE — HISTORY OF PRESENT ILLNESS
[FreeTextEntry1] : 59 y/o male with h/o htn, iddm, overweight, lung nodules, mod AI who presents for f/up today.  last seen 10/23 labs showed elevated LpA seeing Pulm and GI in february notes home bp's 130's/90's started using pepcid and notes improvement in chest discomfort sent for echo  -Echo  1. Left ventricular cavity is normal. Left ventricular wall thickness is normal. Left ventricular systolic function is normal with an ejection fraction visually estimated at 60 %. There are no regional wall motion abnormalities seen. 2. Normal right ventricular cavity size, wall thickness, and systolic function. 3. Normal atria. 4. No pericardial effusion seen. 5. Moderate aortic regurgitation. 6. Severe thickening of the right coronary cusp of the aortic valve.   notes chest discomfort - tightness - can be bilateral - 2/10 - squeezing - lasts hours worse at night, spicy food  no sob, syncope, lh, palpitations, edema, orthopnea, pnd  seen by pulm  for lung nodules seen on CTA cor - sent for CT chest and concern for sarcoid CT chest [facility] 2022 personally reviewed - innumerable tiny nodules and mediastinal adenopathy / spoke to him, rec bronch to exclude sarcoid never had bronch  IDDM since age 18  se[facility] endo at [facility]  does yoga daily runs twice/week - 2 miles diet healthy h/o depression in past stress high   CTA cor : Cardiac: 1. The calcium score is 0 Agatston units. 2. Angiographically normal coronary arteries Non-cardiac: Subpleural solid perilymphatic/centrilobular micronodules predominantly in the upper lung zones measuring up to 5 mm in the left upper lobe. Incompletely visualized small nonenlarged calcified right perihilar lymph nodes. Abbreviated differential includes pulmonary sarcoidosis. An HRCT to include end inspiration/end expiration imaging is suggested for more complete evaluation.  CT chest : Impression: 1. Since 2022, there are again multiple small lung nodules with a perilymphatic distribution bilaterally in addition to mild symmetric thoracic lymphadenopathy. These findings are typical of sarcoidosis. 2. Hepatic steatosis. There are two liver lesions which may represent focal sparing from fatty infiltration, but liver MRI is recommended to rule out a liver mass. 3. Cholelithiasis. 4. Small splenic calcifications may be related to sarcoidosis. Heart and pericardium: Heart size is normal. No pericardial effusion. The aortic valve is mildly calcified. Vessels: Mild coronary artery calcification.  PMH/PSH: iddm lung nodules right knee surgery overweight depression fatty liver mod AI htn  ALL: nkda  MEDS: humalog pump lantus   SH: no tobacco drinks 2 etoh day no drugs marijuana edibles used cocaine on/off 40's for several years  from [place]  daugther 17 healthy   FH: mother - alive, 87, healthy father -  cva 70's 2 sisters - alive, no cvd

## 2023-12-19 NOTE — DISCUSSION/SUMMARY
[Patient] : the patient [___ Week(s)] : in [unfilled] week(s) [FreeTextEntry1] : 57 y/o male with h/o iddm, overweight, lung nodules, mod AI,  htn who presents for f/up today.  -serial echo yearly to f/up moderate AI -Echo 11/23 1. Left ventricular cavity is normal. Left ventricular wall thickness is normal. Left ventricular systolic function is normal with an ejection fraction visually estimated at 60 %. There are no regional wall motion abnormalities seen. 2. Normal right ventricular cavity size, wall thickness, and systolic function. 3. Normal atria. 4. No pericardial effusion seen. 5. Moderate aortic regurgitation. 6. Severe thickening of the right coronary cusp of the aortic valve. -ekg 10/23 - nsr, normal intervals, no st/t changes -labs 2023 reviewed -start norvasc 5 mg qd given htn -start statin lipitor 10 mg qhs given dm and elevated LpA  -continue insulin, endo f/up -CTA cor 2022: Cardiac: 1. The calcium score is 0 Agatston units. 2. Angiographically normal coronary arteries Non-cardiac: Subpleural solid perilymphatic/centrilobular micronodules predominantly in the upper lung zones measuring up to 5 mm in the left upper lobe. Incompletely visualized small nonenlarged calcified right perihilar lymph nodes. Abbreviated differential includes pulmonary sarcoidosis. An HRCT to include end inspiration/end expiration imaging is suggested for more complete evaluation. -CT chest 2022: Impression: 1. Since 6/2/2022, there are again multiple small lung nodules with a perilymphatic distribution bilaterally in addition to mild symmetric thoracic lymphadenopathy. These findings are typical of sarcoidosis. 2. Hepatic steatosis. There are two liver lesions which may represent focal sparing from fatty infiltration, but liver MRI is recommended to rule out a liver mass. 3. Cholelithiasis. 4. Small splenic calcifications may be related to sarcoidosis. Heart and pericardium: Heart size is normal. No pericardial effusion. The aortic valve is mildly calcified. Vessels: Mild coronary artery calcification. -pulm evaluation for sarcoid - possible bronch -cardiac MRI in future if sarcoid seen on bronch -refer to GI for abnl lft, liver mass, possible gerd, fatty liver -will need lipids 3 months -counseled on cvd risk factors, etoh moderation -f/up 2-3 weeks for htn, bp  I have spent 30 minutes reviewing labs,records,tests and discussed cvd risk factors, cp, lung nodules, htn

## 2024-01-30 ENCOUNTER — APPOINTMENT (OUTPATIENT)
Dept: HEART AND VASCULAR | Facility: CLINIC | Age: 59
End: 2024-01-30
Payer: COMMERCIAL

## 2024-01-30 VITALS
HEIGHT: 75 IN | WEIGHT: 220 LBS | SYSTOLIC BLOOD PRESSURE: 118 MMHG | OXYGEN SATURATION: 97 % | HEART RATE: 70 BPM | DIASTOLIC BLOOD PRESSURE: 68 MMHG | TEMPERATURE: 97.3 F | BODY MASS INDEX: 27.35 KG/M2

## 2024-01-30 PROCEDURE — 93000 ELECTROCARDIOGRAM COMPLETE: CPT

## 2024-01-30 PROCEDURE — 99214 OFFICE O/P EST MOD 30 MIN: CPT | Mod: 25

## 2024-01-30 NOTE — HISTORY OF PRESENT ILLNESS
[FreeTextEntry1] : 57 y/o male with h/o htn, iddm, overweight, lung nodules, mod AI who presents for f/up today.  last seen  started on norvasc ordered lipitor but he has not started yet has not seen pulm yet saw GI - had endoscopy - started on PPI  labs showed elevated LpA      -Echo  1. Left ventricular cavity is normal. Left ventricular wall thickness is normal. Left ventricular systolic function is normal with an ejection fraction visually estimated at 60 %. There are no regional wall motion abnormalities seen. 2. Normal right ventricular cavity size, wall thickness, and systolic function. 3. Normal atria. 4. No pericardial effusion seen. 5. Moderate aortic regurgitation. 6. Severe thickening of the right coronary cusp of the aortic valve.   notes chest discomfort - tightness - can be bilateral - 2/10 - squeezing - lasts hours no sob, syncope, lh, palpitations, edema, orthopnea, pnd  seen by pulm  for lung nodules seen on CTA cor - sent for CT chest and concern for sarcoid CT chest [facility] 2022 personally reviewed - innumerable tiny nodules and mediastinal adenopathy / spoke to him, rec bronch to exclude sarcoid never had bronch  IDDM since age 18  sees endo at [facility]  does yoga daily runs twice/week - 2 miles diet healthy h/o depression in past stress high   CTA cor : Cardiac: 1. The calcium score is 0 Agatston units. 2. Angiographically normal coronary arteries Non-cardiac: Subpleural solid perilymphatic/centrilobular micronodules predominantly in the upper lung zones measuring up to 5 mm in the left upper lobe. Incompletely visualized small nonenlarged calcified right perihilar lymph nodes. Abbreviated differential includes pulmonary sarcoidosis. An HRCT to include end inspiration/end expiration imaging is suggested for more complete evaluation.  CT chest : Impression: 1. Since 2022, there are again multiple small lung nodules with a perilymphatic distribution bilaterally in addition to mild symmetric thoracic lymphadenopathy. These findings are typical of sarcoidosis. 2. Hepatic steatosis. There are two liver lesions which may represent focal sparing from fatty infiltration, but liver MRI is recommended to rule out a liver mass. 3. Cholelithiasis. 4. Small splenic calcifications may be related to sarcoidosis. Heart and pericardium: Heart size is normal. No pericardial effusion. The aortic valve is mildly calcified. Vessels: Mild coronary artery calcification.  PMH/PSH: iddm lung nodules right knee surgery overweight depression fatty liver mod AI htn  ALL: nkda  MEDS: humalog pump lantus norvasc 5 mg qd omeprazole    SH: no tobacco drinks 2 etoh day no drugs marijuana edibles used cocaine on/off 40's for several years  from [place]  daugther 15 healthy   FH: mother - alive, 87, healthy father -  cva 70's 2 sisters - alive, no cvd

## 2024-02-05 ENCOUNTER — TRANSCRIPTION ENCOUNTER (OUTPATIENT)
Age: 59
End: 2024-02-05

## 2024-03-14 ENCOUNTER — NON-APPOINTMENT (OUTPATIENT)
Age: 59
End: 2024-03-14

## 2024-03-14 PROBLEM — D86.9 SARCOIDOSIS: Status: ACTIVE | Noted: 2024-01-30

## 2024-03-14 PROBLEM — R91.8 LUNG NODULES: Status: ACTIVE | Noted: 2022-07-05

## 2024-03-14 NOTE — PHYSICAL EXAM
[No Acute Distress] : no acute distress [Normal Oropharynx] : normal oropharynx [Normal Appearance] : normal appearance [No Neck Mass] : no neck mass [Normal Rate/Rhythm] : normal rate/rhythm [No Murmurs] : no murmurs [Normal S1, S2] : normal s1, s2 [No Resp Distress] : no resp distress [Clear to Auscultation Bilaterally] : clear to auscultation bilaterally [No Abnormalities] : no abnormalities [Benign] : benign [Normal Gait] : normal gait [No Cyanosis] : no cyanosis [No Clubbing] : no clubbing [No Edema] : no edema [FROM] : FROM [Normal Color/ Pigmentation] : normal color/ pigmentation [No Focal Deficits] : no focal deficits [Oriented x3] : oriented x3 [Normal Affect] : normal affect

## 2024-03-17 ENCOUNTER — RX RENEWAL (OUTPATIENT)
Age: 59
End: 2024-03-17

## 2024-03-20 ENCOUNTER — TRANSCRIPTION ENCOUNTER (OUTPATIENT)
Age: 59
End: 2024-03-20

## 2024-03-20 ENCOUNTER — APPOINTMENT (OUTPATIENT)
Dept: MRI IMAGING | Facility: CLINIC | Age: 59
End: 2024-03-20

## 2024-03-21 ENCOUNTER — APPOINTMENT (OUTPATIENT)
Dept: PULMONOLOGY | Facility: CLINIC | Age: 59
End: 2024-03-21
Payer: COMMERCIAL

## 2024-03-21 ENCOUNTER — OUTPATIENT (OUTPATIENT)
Dept: OUTPATIENT SERVICES | Facility: HOSPITAL | Age: 59
LOS: 1 days | End: 2024-03-21
Payer: COMMERCIAL

## 2024-03-21 VITALS
HEIGHT: 75 IN | HEART RATE: 73 BPM | DIASTOLIC BLOOD PRESSURE: 74 MMHG | TEMPERATURE: 98.06 F | SYSTOLIC BLOOD PRESSURE: 122 MMHG | WEIGHT: 236 LBS | BODY MASS INDEX: 29.34 KG/M2 | OXYGEN SATURATION: 96 %

## 2024-03-21 DIAGNOSIS — R91.8 OTHER NONSPECIFIC ABNORMAL FINDING OF LUNG FIELD: ICD-10-CM

## 2024-03-21 DIAGNOSIS — D86.9 SARCOIDOSIS, UNSPECIFIED: ICD-10-CM

## 2024-03-21 PROCEDURE — 94726 PLETHYSMOGRAPHY LUNG VOLUMES: CPT | Mod: 26

## 2024-03-21 PROCEDURE — 94760 N-INVAS EAR/PLS OXIMETRY 1: CPT

## 2024-03-21 PROCEDURE — 94618 PULMONARY STRESS TESTING: CPT

## 2024-03-21 PROCEDURE — ZZZZZ: CPT

## 2024-03-21 PROCEDURE — 94060 EVALUATION OF WHEEZING: CPT

## 2024-03-21 PROCEDURE — 94729 DIFFUSING CAPACITY: CPT | Mod: 26

## 2024-03-21 PROCEDURE — 94618 PULMONARY STRESS TESTING: CPT | Mod: 26

## 2024-03-21 PROCEDURE — 94010 BREATHING CAPACITY TEST: CPT | Mod: 26

## 2024-03-21 PROCEDURE — 94726 PLETHYSMOGRAPHY LUNG VOLUMES: CPT

## 2024-03-21 PROCEDURE — 94729 DIFFUSING CAPACITY: CPT

## 2024-03-21 RX ORDER — ALBUTEROL 90 UG/1
2 AEROSOL, METERED ORAL ONCE
Refills: 0 | Status: DISCONTINUED | OUTPATIENT
Start: 2024-03-21 | End: 2024-04-04

## 2024-03-21 NOTE — HISTORY OF PRESENT ILLNESS
[TextBox_4] : 57 yo male referred by Dr. Nielsen, significant medical history of diabetes He previously saw Dr. Sultana He had presented with chest pains and palpitations. He had the chest CT in August 2022 and was given the option to have a biopsy. He chose not to because of 5% of pneumothorax He does not have cough or shortness of breath He climbs 100 flights on a stair master, 4 times a week No skin rashes, joint pains.  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 He would stay for approximately 2 weeks No vision changes

## 2024-03-21 NOTE — PROCEDURE
[FreeTextEntry1] : PFT 3/21/24 FVC: 5.01 L (92%) FEV1: 3.94 L (94%) FEV1/FVC: 79% FEF 25-75%: 3.64 L/s (104%) T.81 L (95%) RV/T% DLCO: 29.95 (94%)  6MWT 3/21/24: 512 meters, SpO2 97% -> 95%  Chest CT 8/15/22 Impression: 1. Since 2022, there are again multiple small lung nodules with a perilymphatic distribution bilaterally in addition to mild symmetric thoracic lymphadenopathy. These findings are typical of sarcoidosis. 2. Hepatic steatosis. There are two liver lesions which may represent focal sparing from fatty infiltration, but liver MRI is recommended to rule out a liver mass. 3. Cholelithiasis. 4. Small splenic calcifications may be related to sarcoidosis.

## 2024-03-21 NOTE — ADDENDUM
[FreeTextEntry1] :    I, Dr. Julian Toney, personally performed the evaluation and management services for this patient who presents  today as a new consultation.  The E/M includes conducting patient interview, detailed physical examination, assessing all  conditions, reviewing all investigations and images and establishing a new plan of care.  Today, my PA, Ms. Sepideh Larsen was present at the patient's assessment. She did not examine the patient nor partake in any decision making process. She listened to my discussions with the patient . I discussed future management with the patient with her.

## 2024-03-21 NOTE — ASSESSMENT
[FreeTextEntry1] : 3-21-24:  It was a pleasure to meet Ayan today in consultation. His respiratory issues are summarized:  1. Lung nodules Reviewed CT scan from 8/2022. There are tiny nodules, more at the upper lung zones compared to bases. These are in a perilymphatic distribution. There are a couple of enlarged mediastinal lymph nodes. There appears to be calcification in the spleen.  Possible diagnoses include: a. Sarcoidosis. These findings are in favor of sarcoidosis. However, calcification in the spleen is less commonly seen in sarcoidosis. b. Histoplasmosis. He would visit family in [place] during the summer when he was younger and would stay for 2 weeks. Splenic calcifications are more commonly seen in histoplasmosis.  Plan: - Repeat Chest CT to re-evaluate the lung nodules - Sarcoid labs looking for activity - Send histoplasma urine to [facility] - Scheduled for cardiac MRI in April  Return to clinic: 3 months

## 2024-03-21 NOTE — DISCUSSION/SUMMARY
[FreeTextEntry1] : ATTENDING'S SUMMARY: 3-21-24: no pallor, no icterus no JVD, no hepatojugular reflux, no HSM no cervical adenopathy, no supraclavicular adenopathy normal s1/s2, no murmurs, rubs or gallops good air entry bilaterally, no wheezing, rhonchi or crackles no cyanosis, no clubbing, no articular manifestations, no thickening of the skin umbilical hernia no pedal edema

## 2024-03-22 ENCOUNTER — NON-APPOINTMENT (OUTPATIENT)
Age: 59
End: 2024-03-22

## 2024-03-22 LAB
24R-OH-CALCIDIOL SERPL-MCNC: 58.1 PG/ML
ALBUMIN SERPL ELPH-MCNC: 4.1 G/DL
ALP BLD-CCNC: 46 U/L
ALT SERPL-CCNC: 30 U/L
ANION GAP SERPL CALC-SCNC: 11 MMOL/L
AST SERPL-CCNC: 30 U/L
BILIRUB SERPL-MCNC: 0.7 MG/DL
BUN SERPL-MCNC: 15 MG/DL
CALCIUM SERPL-MCNC: 9.3 MG/DL
CHLORIDE SERPL-SCNC: 106 MMOL/L
CO2 SERPL-SCNC: 24 MMOL/L
CREAT SERPL-MCNC: 0.99 MG/DL
CRP SERPL-MCNC: <3 MG/L
EGFR: 89 ML/MIN/1.73M2
ERYTHROCYTE [SEDIMENTATION RATE] IN BLOOD BY WESTERGREN METHOD: 16 MM/HR
GLUCOSE SERPL-MCNC: 117 MG/DL
POTASSIUM SERPL-SCNC: 4.3 MMOL/L
PROT SERPL-MCNC: 6.9 G/DL
SODIUM SERPL-SCNC: 141 MMOL/L

## 2024-03-24 LAB — IL2 SERPL-MCNC: 1057.7 PG/ML

## 2024-03-25 LAB — ACE BLD-CCNC: 72 U/L

## 2024-03-26 LAB
AMYLASE P SERPL-CCNC: 15 U/L
AMYLASE S SERPL-CCNC: 32 U/L
AMYLASE SERPL-CCNC: 47 U/L

## 2024-03-27 LAB
CHITOTRIOSIDASE SERPL-CCNC: 0 NMOLES/HR/ML
LYSOZYME SERPL-MCNC: 3.9 UG/ML

## 2024-03-28 LAB
MISCELLANEOUS TEST: NORMAL
PROC NAME: NORMAL

## 2024-04-04 ENCOUNTER — APPOINTMENT (OUTPATIENT)
Dept: MRI IMAGING | Facility: CLINIC | Age: 59
End: 2024-04-04
Payer: COMMERCIAL

## 2024-04-04 ENCOUNTER — OUTPATIENT (OUTPATIENT)
Dept: OUTPATIENT SERVICES | Facility: HOSPITAL | Age: 59
LOS: 1 days | End: 2024-04-04

## 2024-04-04 PROCEDURE — 74183 MRI ABD W/O CNTR FLWD CNTR: CPT | Mod: 26

## 2024-04-11 ENCOUNTER — TRANSCRIPTION ENCOUNTER (OUTPATIENT)
Age: 59
End: 2024-04-11

## 2024-05-06 ENCOUNTER — OUTPATIENT (OUTPATIENT)
Dept: OUTPATIENT SERVICES | Facility: HOSPITAL | Age: 59
LOS: 1 days | End: 2024-05-06

## 2024-05-21 ENCOUNTER — RX RENEWAL (OUTPATIENT)
Age: 59
End: 2024-05-21

## 2024-05-21 RX ORDER — ATORVASTATIN CALCIUM 10 MG/1
10 TABLET, FILM COATED ORAL
Qty: 90 | Refills: 1 | Status: ACTIVE | COMMUNITY
Start: 2023-12-19 | End: 1900-01-01

## 2024-06-11 ENCOUNTER — APPOINTMENT (OUTPATIENT)
Dept: MRI IMAGING | Facility: CLINIC | Age: 59
End: 2024-06-11
Payer: COMMERCIAL

## 2024-06-11 ENCOUNTER — RESULT REVIEW (OUTPATIENT)
Age: 59
End: 2024-06-11

## 2024-06-11 ENCOUNTER — OUTPATIENT (OUTPATIENT)
Dept: OUTPATIENT SERVICES | Facility: HOSPITAL | Age: 59
LOS: 1 days | End: 2024-06-11

## 2024-06-11 PROCEDURE — 75565 CARD MRI VELOC FLOW MAPPING: CPT | Mod: 26

## 2024-06-11 PROCEDURE — 75561 CARDIAC MRI FOR MORPH W/DYE: CPT | Mod: 26

## 2024-06-13 ENCOUNTER — RX RENEWAL (OUTPATIENT)
Age: 59
End: 2024-06-13

## 2024-06-13 ENCOUNTER — NON-APPOINTMENT (OUTPATIENT)
Age: 59
End: 2024-06-13

## 2024-06-13 RX ORDER — AMLODIPINE BESYLATE 5 MG/1
5 TABLET ORAL DAILY
Qty: 90 | Refills: 1 | Status: ACTIVE | COMMUNITY
Start: 2023-12-19 | End: 1900-01-01

## 2024-06-14 ENCOUNTER — TRANSCRIPTION ENCOUNTER (OUTPATIENT)
Age: 59
End: 2024-06-14

## 2024-06-18 ENCOUNTER — TRANSCRIPTION ENCOUNTER (OUTPATIENT)
Age: 59
End: 2024-06-18

## 2024-07-01 ENCOUNTER — APPOINTMENT (OUTPATIENT)
Dept: HEART AND VASCULAR | Facility: CLINIC | Age: 59
End: 2024-07-01
Payer: COMMERCIAL

## 2024-07-01 ENCOUNTER — NON-APPOINTMENT (OUTPATIENT)
Age: 59
End: 2024-07-01

## 2024-07-01 VITALS
SYSTOLIC BLOOD PRESSURE: 135 MMHG | TEMPERATURE: 98.6 F | OXYGEN SATURATION: 97 % | HEIGHT: 75 IN | WEIGHT: 236 LBS | HEART RATE: 70 BPM | DIASTOLIC BLOOD PRESSURE: 83 MMHG | RESPIRATION RATE: 14 BRPM | BODY MASS INDEX: 29.34 KG/M2

## 2024-07-01 PROCEDURE — 93000 ELECTROCARDIOGRAM COMPLETE: CPT

## 2024-07-01 PROCEDURE — 99204 OFFICE O/P NEW MOD 45 MIN: CPT | Mod: 25

## 2024-07-01 PROCEDURE — 99214 OFFICE O/P EST MOD 30 MIN: CPT | Mod: 25

## 2024-07-01 RX ORDER — OMEPRAZOLE 40 MG/1
CAPSULE, DELAYED RELEASE ORAL
Refills: 0 | Status: ACTIVE | COMMUNITY

## 2024-07-02 ENCOUNTER — APPOINTMENT (OUTPATIENT)
Dept: NUCLEAR MEDICINE | Facility: HOSPITAL | Age: 59
End: 2024-07-02

## 2024-07-02 ENCOUNTER — OUTPATIENT (OUTPATIENT)
Dept: OUTPATIENT SERVICES | Facility: HOSPITAL | Age: 59
LOS: 1 days | End: 2024-07-02
Payer: COMMERCIAL

## 2024-07-02 ENCOUNTER — RESULT REVIEW (OUTPATIENT)
Age: 59
End: 2024-07-02

## 2024-07-02 LAB — GLUCOSE BLDC GLUCOMTR-MCNC: 97 MG/DL — SIGNIFICANT CHANGE UP (ref 70–99)

## 2024-07-02 PROCEDURE — 78429 MYOCRD IMG PET 1 STD W/CT: CPT

## 2024-07-02 PROCEDURE — A9500: CPT

## 2024-07-02 PROCEDURE — 78429 MYOCRD IMG PET 1 STD W/CT: CPT | Mod: 26

## 2024-07-02 PROCEDURE — 78451 HT MUSCLE IMAGE SPECT SING: CPT | Mod: 26

## 2024-07-02 PROCEDURE — 78451 HT MUSCLE IMAGE SPECT SING: CPT

## 2024-07-02 PROCEDURE — 82962 GLUCOSE BLOOD TEST: CPT

## 2024-07-05 ENCOUNTER — NON-APPOINTMENT (OUTPATIENT)
Age: 59
End: 2024-07-05

## 2024-07-09 ENCOUNTER — APPOINTMENT (OUTPATIENT)
Dept: PULMONOLOGY | Facility: CLINIC | Age: 59
End: 2024-07-09
Payer: COMMERCIAL

## 2024-07-09 VITALS
HEIGHT: 75 IN | TEMPERATURE: 97.3 F | SYSTOLIC BLOOD PRESSURE: 122 MMHG | BODY MASS INDEX: 29.09 KG/M2 | HEART RATE: 67 BPM | WEIGHT: 234 LBS | DIASTOLIC BLOOD PRESSURE: 71 MMHG | OXYGEN SATURATION: 97 %

## 2024-07-09 DIAGNOSIS — D86.9 SARCOIDOSIS, UNSPECIFIED: ICD-10-CM

## 2024-07-09 DIAGNOSIS — R91.8 OTHER NONSPECIFIC ABNORMAL FINDING OF LUNG FIELD: ICD-10-CM

## 2024-07-09 PROCEDURE — 99215 OFFICE O/P EST HI 40 MIN: CPT

## 2024-07-15 ENCOUNTER — OUTPATIENT (OUTPATIENT)
Dept: OUTPATIENT SERVICES | Facility: HOSPITAL | Age: 59
LOS: 1 days | End: 2024-07-15

## 2024-07-15 ENCOUNTER — APPOINTMENT (OUTPATIENT)
Dept: CT IMAGING | Facility: CLINIC | Age: 59
End: 2024-07-15
Payer: COMMERCIAL

## 2024-07-15 PROCEDURE — 71250 CT THORAX DX C-: CPT | Mod: 26

## 2024-07-16 ENCOUNTER — NON-APPOINTMENT (OUTPATIENT)
Age: 59
End: 2024-07-16

## 2024-08-16 ENCOUNTER — LABORATORY RESULT (OUTPATIENT)
Age: 59
End: 2024-08-16

## 2024-08-16 ENCOUNTER — APPOINTMENT (OUTPATIENT)
Dept: RHEUMATOLOGY | Facility: CLINIC | Age: 59
End: 2024-08-16
Payer: COMMERCIAL

## 2024-08-16 VITALS
BODY MASS INDEX: 29.09 KG/M2 | HEART RATE: 70 BPM | WEIGHT: 234 LBS | DIASTOLIC BLOOD PRESSURE: 76 MMHG | TEMPERATURE: 97.9 F | HEIGHT: 75 IN | OXYGEN SATURATION: 98 % | SYSTOLIC BLOOD PRESSURE: 123 MMHG

## 2024-08-16 DIAGNOSIS — D86.9 SARCOIDOSIS, UNSPECIFIED: ICD-10-CM

## 2024-08-16 DIAGNOSIS — R91.8 OTHER NONSPECIFIC ABNORMAL FINDING OF LUNG FIELD: ICD-10-CM

## 2024-08-16 PROCEDURE — G2211 COMPLEX E/M VISIT ADD ON: CPT

## 2024-08-16 PROCEDURE — 99205 OFFICE O/P NEW HI 60 MIN: CPT

## 2024-08-16 PROCEDURE — 36415 COLL VENOUS BLD VENIPUNCTURE: CPT

## 2024-08-16 NOTE — HISTORY OF PRESENT ILLNESS
[FreeTextEntry1] : CC: Sarcoidosis with pulmonary and cardiac involvement  PET scan did not show active disease  HPI: 59 y old F with PMH of DM, Previously seen by Dr Sultana and most recently evaluated by Dr Toney initial visit 3/21/24 , had initially presented with palpitations and chest tightness , CT  8/2022 patient chose not to proceed with biopsy that time, no sob or cough , intermittent chest tightness and palpitations still present , In the past frequent visit of family in [place] urinary Histoplasma was negative PET scan did not show active cardiac disease 2/24 but active sarcoidosis lymph nodes in  the parabronchial region extending in to the hilar regions bilaterally with involvement  of nodes surrounding the proximal bronchial tree , non specific activity in the small lymph nodes in the right inguinal region , irregular activity is seen in the bones   7/15/24 CT chest  1. Since 8/15/2022, there are again multiple small solid pulmonary subcentimeter and micronodules with a perilymphatic and subpleural distribution predominantly in the upper and midlung zones. This together with stable moderate mediastinal and bilateral hilar lymphadenopathy are highly suggestive of radiographic stage II sarcoidosis unchanged in extent.  2. Additional stable findings as described above.  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

## 2024-08-16 NOTE — ASSESSMENT
[FreeTextEntry1] : 59 y old M with PMH of DM, initially evaluated for palpitations and intermittent chest tightness 8/22 CT chest suggestive of Pulmonary sarcoidosis, Cardiac MRI Suggested cardiac involvement and PET scan 7/2/24 showed not active cardiac sarcoidosis but other changes of active sarcoidosis    PET scan 7/2/24  1.  No evidence of cardiac sarcoidosis. 2.  Findings in the chest are typical of active sarcoidosis including lymph nodes in the peribronchial region extending into the hilar regions bilaterally with involvement of nodes surrounding the proximal bronchial tree. 3.  Nonspecific activity in small lymph nodes in the right inguinal region. 4.  Irregular activity is seen in the bones.  This finding may be of no clinical significance, but is unexplained by the available information.  CT chest 7/25/24  1. Since 8/15/2022, there are again multiple small solid pulmonary subcentimeter and micronodules with a perilymphatic and subpleural distribution predominantly in the upper and midlung zones. This together with stable moderate mediastinal and bilateral hilar lymphadenopathy are highly suggestive of radiographic stage II sarcoidosis unchanged in extent.  2. Additional stable findings as described above  -no other active symptoms of extrapulmonary sarcoidosis, with PET scan suggestive active chest peribronchial lymph nodes but no cough no sob intermittent chest tighness,  PET scan did not show changes suggestive of active cardiac sarcoidosis -repeat blood work ACE, CBC, CMP, ESR and CRP, IL 2 Recept DC 25 ( was high 3/24)  -follow up with pulmonary and cardiolgoy might need steroids vs DMARD therapy due to activity suggested with peribronchial lymph nodes  -LE pitting Edema, with superficial varicose veins most likely due to venous insufficiency , tx Amlodipine 5 mg , advised to use compression stocking and follow up with PMD and Cardiology to see if medication for HTN can be switched

## 2024-08-16 NOTE — DATA REVIEWED
[FreeTextEntry1] : Increased Interleukin 2 receptor CD 25 3/22/24 1057.7  normal ACE, normal CRP and ESR  Histoplasma Ag urine negative  AST and ALT wnl  Vit D 1,25 58.1   PET scan 24 Impression: 1.  No evidence of cardiac sarcoidosis. 2.  Findings in the chest are typical of active sarcoidosis including lymph nodes in the peribronchial region extending into the hilar regions bilaterally with involvement of nodes surrounding the proximal bronchial tree. 3.  Nonspecific activity in small lymph nodes in the right inguinal region. 4.  Irregular activity is seen in the bones.  This finding may be of no clinical significance, but is unexplained by the available information.  PFT 3/21/24 FVC: 5.01 L (92%) FEV1: 3.94 L (94%) FEV1/FVC: 79% FEF 25-75%: 3.64 L/s (104%) T.81 L (95%) RV/T% DLCO: 29.95 (94%)  6MWT 3/21/24: 512 meters, SpO2 97% -> 95%  Chest CT 8/15/22 Impression: 1. Since 2022, there are again multiple small lung nodules with a perilymphatic distribution bilaterally in addition to mild symmetric thoracic lymphadenopathy. These findings are typical of sarcoidosis. 2. Hepatic steatosis. There are two liver lesions which may represent focal sparing from fatty infiltration, but liver MRI is recommended to rule out a liver mass. 3. Cholelithiasis. 4. Small splenic calcifications may be related to sarcoidosis.

## 2024-08-16 NOTE — PHYSICAL EXAM
[PERRL With Normal Accommodation] : pupils were equal in size, round, and reactive to light [Examination Of The Oral Cavity] : the lips and gums were normal [Oropharynx] : the oropharynx was normal [] : no respiratory distress [Respiration, Rhythm And Depth] : normal respiratory rhythm and effort [Auscultation Breath Sounds / Voice Sounds] : lungs were clear to auscultation bilaterally [Heart Rate And Rhythm] : heart rate was normal and rhythm regular [Heart Sounds] : normal S1 and S2 [Murmurs] : no murmurs [Bowel Sounds] : normal bowel sounds [Abdomen Soft] : soft [Abdomen Tenderness] : non-tender [No Spinal Tenderness] : no spinal tenderness [Musculoskeletal - Swelling] : no joint swelling seen [Motor Tone] : muscle strength and tone were normal [FreeTextEntry1] : hperpigmented areas anterior tibia area bl LE , superficial varicose veins bl LE  [Motor Exam] : the motor exam was normal

## 2024-08-17 LAB
ALBUMIN SERPL ELPH-MCNC: 4.1 G/DL
ALP BLD-CCNC: 43 U/L
ALT SERPL-CCNC: 36 U/L
ANION GAP SERPL CALC-SCNC: 15 MMOL/L
APPEARANCE: ABNORMAL
AST SERPL-CCNC: 29 U/L
BILIRUB SERPL-MCNC: 0.6 MG/DL
BILIRUBIN URINE: NEGATIVE
BLOOD URINE: NEGATIVE
BUN SERPL-MCNC: 16 MG/DL
CALCIUM SERPL-MCNC: 9.2 MG/DL
CCP AB SER IA-ACNC: <8 U/ML
CHLORIDE SERPL-SCNC: 104 MMOL/L
CO2 SERPL-SCNC: 21 MMOL/L
COLOR: NORMAL
CREAT SERPL-MCNC: 0.9 MG/DL
CRP SERPL-MCNC: <3 MG/L
EGFR: 100 ML/MIN/1.73M2
ERYTHROCYTE [SEDIMENTATION RATE] IN BLOOD BY WESTERGREN METHOD: 15 MM/HR
GLUCOSE QUALITATIVE U: NEGATIVE MG/DL
GLUCOSE SERPL-MCNC: 108 MG/DL
KETONES URINE: ABNORMAL MG/DL
LEUKOCYTE ESTERASE URINE: NEGATIVE
NITRITE URINE: NEGATIVE
PH URINE: 5.5
POTASSIUM SERPL-SCNC: 4.1 MMOL/L
PROT SERPL-MCNC: 6.7 G/DL
PROTEIN URINE: NEGATIVE MG/DL
RF+CCP IGG SER-IMP: NEGATIVE
RHEUMATOID FACT SER QL: <10 IU/ML
SODIUM SERPL-SCNC: 140 MMOL/L
SPECIFIC GRAVITY URINE: 1.03
UROBILINOGEN URINE: 0.2 MG/DL

## 2024-08-18 LAB
ALBUMIN MFR SERPL ELPH: 58.5 %
ALBUMIN SERPL-MCNC: 3.9 G/DL
ALBUMIN/GLOB SERPL: 1.4 RATIO
ALPHA1 GLOB MFR SERPL ELPH: 4.3 %
ALPHA1 GLOB SERPL ELPH-MCNC: 0.3 G/DL
ALPHA2 GLOB MFR SERPL ELPH: 7.4 %
ALPHA2 GLOB SERPL ELPH-MCNC: 0.5 G/DL
B-GLOBULIN MFR SERPL ELPH: 11.2 %
B-GLOBULIN SERPL ELPH-MCNC: 0.8 G/DL
GAMMA GLOB FLD ELPH-MCNC: 1.2 G/DL
GAMMA GLOB MFR SERPL ELPH: 18.6 %
IL2 SERPL-MCNC: 445.4 PG/ML
INTERPRETATION SERPL IEP-IMP: NORMAL
M PROTEIN SPEC IFE-MCNC: NORMAL
PROT SERPL-MCNC: 6.7 G/DL
PROT SERPL-MCNC: 6.7 G/DL

## 2024-08-19 LAB
ACE BLD-CCNC: 74 U/L
C3 SERPL-MCNC: 113 MG/DL
C4 SERPL-MCNC: 25 MG/DL

## 2024-08-23 LAB — G6PD SER-CCNC: 15.5 U/G HGB

## 2024-08-28 ENCOUNTER — TRANSCRIPTION ENCOUNTER (OUTPATIENT)
Age: 59
End: 2024-08-28

## 2024-09-03 ENCOUNTER — TRANSCRIPTION ENCOUNTER (OUTPATIENT)
Age: 59
End: 2024-09-03

## 2024-09-11 ENCOUNTER — APPOINTMENT (OUTPATIENT)
Dept: RHEUMATOLOGY | Facility: CLINIC | Age: 59
End: 2024-09-11
Payer: COMMERCIAL

## 2024-09-11 PROCEDURE — G2211 COMPLEX E/M VISIT ADD ON: CPT | Mod: NC

## 2024-09-11 PROCEDURE — 99215 OFFICE O/P EST HI 40 MIN: CPT

## 2024-09-11 NOTE — DATA REVIEWED
[FreeTextEntry1] : 24 we repeated Blood work [id number] was high 3/24 that improved to normal 24 Normal ACE also negative RF , CCP, ANCA, C3 and C4  normal ESR and CRP  SPEP normal pattern  FLT wnl 3/22/24  Increased Interleukin 2 receptor CD 25 3/22/24 1057.7  normal ACE, normal CRP and ESR  Histoplasma Ag urine negative  AST and ALT wnl  Vit D 1,25 58.1   PET scan 24 Impression: 1.  No evidence of cardiac sarcoidosis. 2.  Findings in the chest are typical of active sarcoidosis including lymph nodes in the peribronchial region extending into the hilar regions bilaterally with involvement of nodes surrounding the proximal bronchial tree. 3.  Nonspecific activity in small lymph nodes in the right inguinal region. 4.  Irregular activity is seen in the bones.  This finding may be of no clinical significance, but is unexplained by the available information.  PFT 3/21/24 FVC: 5.01 L (92%) FEV1: 3.94 L (94%) FEV1/FVC: 79% FEF 25-75%: 3.64 L/s (104%) T.81 L (95%) RV/T% DLCO: 29.95 (94%)  6MWT 3/21/24: 512 meters, SpO2 97% -> 95%  Chest CT 8/15/22 Impression: 1. Since 2022, there are again multiple small lung nodules with a perilymphatic distribution bilaterally in addition to mild symmetric thoracic lymphadenopathy. These findings are typical of sarcoidosis. 2. Hepatic steatosis. There are two liver lesions which may represent focal sparing from fatty infiltration, but liver MRI is recommended to rule out a liver mass. 3. Cholelithiasis. 4. Small splenic calcifications may be related to sarcoidosis.

## 2024-09-11 NOTE — HISTORY OF PRESENT ILLNESS
[___ Week(s) Ago] : [unfilled] week(s) ago [FreeTextEntry1] : no sob no chest pain no cough no skin rash no joint swelling  no other symptoms of active sarcoidosis we repeated Blood work [id number] was high 3/24 that improved to normal 8/16/24 Normal ACE also negative RF , CCP, ANCA, C3 and C4  normal ESR and CRP  SPEP normal pattern  FLT wnl

## 2024-09-11 NOTE — ASSESSMENT
[FreeTextEntry1] : 59 y old M with PMH of DM, initially evaluated for palpitations and intermittent chest tightness 8/22 CT chest suggestive of Pulmonary sarcoidosis, Cardiac MRI Suggested cardiac involvement and PET scan 7/2/24 showed not active cardiac sarcoidosis but other changes of active sarcoidosis  , no symptoms of Active Sarcoidosis currently    PET scan 7/2/24  1.  No evidence of cardiac sarcoidosis. 2.  Findings in the chest are typical of active sarcoidosis including lymph nodes in the peribronchial region extending into the hilar regions bilaterally with involvement of nodes surrounding the proximal bronchial tree. 3.  Nonspecific activity in small lymph nodes in the right inguinal region. 4.  Irregular activity is seen in the bones.  This finding may be of no clinical significance, but is unexplained by the available information.  CT chest 7/25/24  1. Since 8/15/2022, there are again multiple small solid pulmonary subcentimeter and micronodules with a perilymphatic and subpleural distribution predominantly in the upper and midlung zones. This together with stable moderate mediastinal and bilateral hilar lymphadenopathy are highly suggestive of radiographic stage II sarcoidosis unchanged in extent.  2. Additional stable findings as described above  -no other active symptoms of extra pulmonary sarcoidosis, with PET scan suggestive active chest peribronchial lymph nodes but no cough no sob intermittent chest tighness,  PET scan did not show changes suggestive of active cardiac sarcoidosis, followed by Pulmonary and Cardiology  -repeat blood work reviewed with the patient  -8/16/24 : [id number] was high 3/24 that improved to normal 8/16/24 Normal ACE also negative RF , CCP, ANCA, C3 and C4  normal ESR and CRP  SPEP normal pattern   -follow up with pulmonary and cardiology   -LE pitting Edema, with superficial varicose veins most likely due to venous insufficiency , tx Amlodipine 5 mg , advised to use compression stocking and follow up with PMD and Cardiology to see if medication for HTN can be switched

## 2024-09-30 ENCOUNTER — APPOINTMENT (OUTPATIENT)
Dept: HEART AND VASCULAR | Facility: CLINIC | Age: 59
End: 2024-09-30
Payer: COMMERCIAL

## 2024-09-30 VITALS
DIASTOLIC BLOOD PRESSURE: 72 MMHG | HEART RATE: 82 BPM | HEIGHT: 75 IN | OXYGEN SATURATION: 98 % | TEMPERATURE: 98 F | SYSTOLIC BLOOD PRESSURE: 122 MMHG | BODY MASS INDEX: 27.98 KG/M2 | WEIGHT: 225 LBS

## 2024-09-30 DIAGNOSIS — R07.9 CHEST PAIN, UNSPECIFIED: ICD-10-CM

## 2024-09-30 PROCEDURE — 99214 OFFICE O/P EST MOD 30 MIN: CPT | Mod: 25

## 2024-09-30 PROCEDURE — 36415 COLL VENOUS BLD VENIPUNCTURE: CPT

## 2024-09-30 RX ORDER — ISOSORBIDE MONONITRATE 30 MG/1
30 TABLET, EXTENDED RELEASE ORAL
Qty: 90 | Refills: 1 | Status: ACTIVE | COMMUNITY
Start: 2024-09-30 | End: 1900-01-01

## 2024-09-30 NOTE — HISTORY OF PRESENT ILLNESS
[FreeTextEntry1] : 57 y/o male with h/o htn, iddm, overweight, lung nodules, mod AI, sarocid who presents for f/up today.  last seen  lipitor started   notes still has recurrent cp -  no sob, syncope, lh, palpitations, edema, orthopnea, pnd notes some ankle swelling  seen by pulm seen by EP seen by rheum  PET scan 24 1. No evidence of cardiac sarcoidosis. 2. Findings in the chest are typical of active sarcoidosis including lymph nodes in the peribronchial region extending into the hilar regions bilaterally with involvement of nodes surrounding the proximal bronchial tree. 3. Nonspecific activity in small lymph nodes in the right inguinal region. 4. Irregular activity is seen in the bones. This finding may be of no clinical significance, but is unexplained by the available information.  CT chest 24 1. Since 8/15/2022, there are again multiple small solid pulmonary subcentimeter and micronodules with a perilymphatic and subpleural distribution predominantly in the upper and midlung zones. This together with stable moderate mediastinal and bilateral hilar lymphadenopathy are highly suggestive of radiographic stage II sarcoidosis unchanged in extent.  Cardiac MRI 24 Impression: 1. The left ventricle (LV) is normal in size. There is no evidence of LV hypertrophy . Left ventricular global systolic function is normal. The LV ejection fraction is 64 %. 2. The right ventricle (RV) is normal in size. RV global systolic function is normal. The RV ejection fraction is 51 %. 3. Fusion of the right and left coronary cusps. MIld aortic regurgitation by phase contrast imaging. 4. Mild mitral regurgitation by indirect quantification. 5. No significant abnormalities of the visualized portions of the great vessels. 6. On delayed enhancement imaging, there is enhancement of the basal to mid RV infero insertion points. Additionally there is midwall hyperenhancement of the basal septum and a small focus of subepicardial enhancement(non-ischemic patterns) in the mid inferior wall segment.  The sequences used in this study were designed for imaging cardiac structures and are suboptimal for imaging other structures and organs.   saw GI - had endoscopy - started on PPI  labs showed elevated LpA   -Echo  1. Left ventricular cavity is normal. Left ventricular wall thickness is normal. Left ventricular systolic function is normal with an ejection fraction visually estimated at 60 %. There are no regional wall motion abnormalities seen. 2. Normal right ventricular cavity size, wall thickness, and systolic function. 3. Normal atria. 4. No pericardial effusion seen. 5. Moderate aortic regurgitation. 6. Severe thickening of the right coronary cusp of the aortic valve.    seen by pulm  for lung nodules seen on CTA cor - sent for CT chest and concern for sarcoid CT chest [facility] 2022 personally reviewed - innumerable tiny nodules and mediastinal adenopathy / spoke to him, rec bronch to exclude sarcoid never had bronch  IDDM since age 18  [facility]o [facility]  does yoga daily runs twice/week - 2 miles diet healthy h/o depression in past stress high   CTA cor : Cardiac: 1. The calcium score is 0 Agatston units. 2. Angiographically normal coronary arteries Non-cardiac: Subpleural solid perilymphatic/centrilobular micronodules predominantly in the upper lung zones measuring up to 5 mm in the left upper lobe. Incompletely visualized small nonenlarged calcified right perihilar lymph nodes. Abbreviated differential includes pulmonary sarcoidosis. An HRCT to include end inspiration/end expiration imaging is suggested for more complete evaluation.  CT chest : Impression: 1. Since 2022, there are again multiple small lung nodules with a perilymphatic distribution bilaterally in addition to mild symmetric thoracic lymphadenopathy. These findings are typical of sarcoidosis. 2. Hepatic steatosis. There are two liver lesions which may represent focal sparing from fatty infiltration, but liver MRI is recommended to rule out a liver mass. 3. Cholelithiasis. 4. Small splenic calcifications may be related to sarcoidosis. Heart and pericardium: Heart size is normal. No pericardial effusion. The aortic valve is mildly calcified. Vessels: Mild coronary artery calcification.  PMH/PSH: iddm lung nodules right knee surgery overweight depression fatty liver mod AI htn sarcoid  ALL: nkda  MEDS: humalog pump lantus norvasc 5 mg qd omeprazole lipitor 10 mg qhs    SH: no tobacco drinks 2 etoh day no drugs marijuana edibles used cocaine on/off 40's for several years  from [place]  daugther 17 healthy   FH: mother - alive, 87, healthy father -  cva 70's 2 sisters - alive, no cvd

## 2024-09-30 NOTE — DISCUSSION/SUMMARY
[Patient] : the patient [___ Month(s)] : in [unfilled] month(s) [FreeTextEntry1] : 60 y/o male with h/o iddm, overweight, lung nodules, mod AI, htn, sarcoid who presents for f/up today.   -CTA cor ordered for cp -start imdur 30 for possible microvascular disease -lower norvasc to 2.5 mg given ankle edema -PET scan 7/2/24 1. No evidence of cardiac sarcoidosis. 2. Findings in the chest are typical of active sarcoidosis including lymph nodes in the peribronchial region extending into the hilar regions bilaterally with involvement of nodes surrounding the proximal bronchial tree. 3. Nonspecific activity in small lymph nodes in the right inguinal region. 4. Irregular activity is seen in the bones. This finding may be of no clinical significance, but is unexplained by the available information.  -CT chest 7/25/24 1. Since 8/15/2022, there are again multiple small solid pulmonary subcentimeter and micronodules with a perilymphatic and subpleural distribution predominantly in the upper and midlung zones. This together with stable moderate mediastinal and bilateral hilar lymphadenopathy are highly suggestive of radiographic stage II sarcoidosis unchanged in extent.  -Cardiac MRI 6/11/24 Impression: 1. The left ventricle (LV) is normal in size. There is no evidence of LV hypertrophy . Left ventricular global systolic function is normal. The LV ejection fraction is 64 %. 2. The right ventricle (RV) is normal in size. RV global systolic function is normal. The RV ejection fraction is 51 %. 3. Fusion of the right and left coronary cusps. MIld aortic regurgitation by phase contrast imaging. 4. Mild mitral regurgitation by indirect quantification. 5. No significant abnormalities of the visualized portions of the great vessels. 6. On delayed enhancement imaging, there is enhancement of the basal to mid RV infero insertion points. Additionally there is midwall hyperenhancement of the basal septum and a small focus of subepicardial enhancement(non-ischemic patterns) in the mid inferior wall segment.  -Echo 11/23 1. Left ventricular cavity is normal. Left ventricular wall thickness is normal. Left ventricular systolic function is normal with an ejection fraction visually estimated at 60 %. There are no regional wall motion abnormalities seen. 2. Normal right ventricular cavity size, wall thickness, and systolic function. 3. Normal atria. 4. No pericardial effusion seen. 5. Moderate aortic regurgitation. 6. Severe thickening of the right coronary cusp of the aortic valve. -ekg 1/24 - nsr, normal intervals, no st/t changes -ekg 7/24 reviewed  -labs 2024 reviewed, lipids, a1c, tsh ordered -continue statin -continue insulin, endo f/up -CTA cor 2022: Cardiac: 1. The calcium score is 0 Agatston units. 2. Angiographically normal coronary arteries Non-cardiac: Subpleural solid perilymphatic/centrilobular micronodules predominantly in the upper lung zones measuring up to 5 mm in the left upper lobe. Incompletely visualized small nonenlarged calcified right perihilar lymph nodes. Abbreviated differential includes pulmonary sarcoidosis. An HRCT to include end inspiration/end expiration imaging is suggested for more complete evaluation. -CT chest 2022: Impression: 1. Since 6/2/2022, there are again multiple small lung nodules with a perilymphatic distribution bilaterally in addition to mild symmetric thoracic lymphadenopathy. These findings are typical of sarcoidosis. 2. Hepatic steatosis. There are two liver lesions which may represent focal sparing from fatty infiltration, but liver MRI is recommended to rule out a liver mass. 3. Cholelithiasis. 4. Small splenic calcifications may be related to sarcoidosis. Heart and pericardium: Heart size is normal. No pericardial effusion. The aortic valve is mildly calcified. Vessels: Mild coronary artery calcification. -pulm f/up for sarcoid   -EP f/up -rheum f/up -f/up w GI for abnl lft, liver mass, possible gerd, fatty liver, now on PPI -counseled on cvd risk factors, etoh moderation -f/up 1 month   I have spent 30 minutes reviewing labs, records ,tests and discussed cvd risk factors, cp, lung nodules, htn, cp evaluation.

## 2024-10-01 ENCOUNTER — TRANSCRIPTION ENCOUNTER (OUTPATIENT)
Age: 59
End: 2024-10-01

## 2024-10-01 LAB
CHOLEST SERPL-MCNC: 136 MG/DL
ESTIMATED AVERAGE GLUCOSE: 120 MG/DL
HBA1C MFR BLD HPLC: 5.8 %
HDLC SERPL-MCNC: 77 MG/DL
LDLC SERPL CALC-MCNC: 46 MG/DL
NONHDLC SERPL-MCNC: 59 MG/DL
TRIGL SERPL-MCNC: 63 MG/DL
TSH SERPL-ACNC: 1.57 UIU/ML

## 2024-10-09 ENCOUNTER — RESULT REVIEW (OUTPATIENT)
Age: 59
End: 2024-10-09

## 2024-10-11 ENCOUNTER — TRANSCRIPTION ENCOUNTER (OUTPATIENT)
Age: 59
End: 2024-10-11

## 2024-10-24 ENCOUNTER — TRANSCRIPTION ENCOUNTER (OUTPATIENT)
Age: 59
End: 2024-10-24

## 2024-10-25 ENCOUNTER — RESULT REVIEW (OUTPATIENT)
Age: 59
End: 2024-10-25

## 2024-10-25 ENCOUNTER — OUTPATIENT (OUTPATIENT)
Dept: OUTPATIENT SERVICES | Facility: HOSPITAL | Age: 59
LOS: 1 days | End: 2024-10-25
Payer: COMMERCIAL

## 2024-10-25 DIAGNOSIS — I35.1 NONRHEUMATIC AORTIC (VALVE) INSUFFICIENCY: ICD-10-CM

## 2024-10-25 PROCEDURE — 93306 TTE W/DOPPLER COMPLETE: CPT | Mod: 26

## 2024-10-25 PROCEDURE — 93306 TTE W/DOPPLER COMPLETE: CPT

## 2024-10-25 PROCEDURE — 76376 3D RENDER W/INTRP POSTPROCES: CPT | Mod: 26

## 2024-10-28 ENCOUNTER — TRANSCRIPTION ENCOUNTER (OUTPATIENT)
Age: 59
End: 2024-10-28

## 2024-11-13 ENCOUNTER — NON-APPOINTMENT (OUTPATIENT)
Age: 59
End: 2024-11-13

## 2024-11-13 ENCOUNTER — APPOINTMENT (OUTPATIENT)
Dept: HEART AND VASCULAR | Facility: CLINIC | Age: 59
End: 2024-11-13

## 2024-11-25 ENCOUNTER — APPOINTMENT (OUTPATIENT)
Dept: HEART AND VASCULAR | Facility: CLINIC | Age: 59
End: 2024-11-25
Payer: COMMERCIAL

## 2024-11-25 ENCOUNTER — NON-APPOINTMENT (OUTPATIENT)
Age: 59
End: 2024-11-25

## 2024-11-25 VITALS — BODY MASS INDEX: 27.98 KG/M2 | WEIGHT: 225 LBS | TEMPERATURE: 97.1 F | HEIGHT: 75 IN

## 2024-11-25 VITALS — DIASTOLIC BLOOD PRESSURE: 77 MMHG | HEART RATE: 71 BPM | SYSTOLIC BLOOD PRESSURE: 131 MMHG

## 2024-11-25 PROCEDURE — 93000 ELECTROCARDIOGRAM COMPLETE: CPT

## 2024-11-25 PROCEDURE — 99213 OFFICE O/P EST LOW 20 MIN: CPT

## 2024-11-25 PROCEDURE — G2211 COMPLEX E/M VISIT ADD ON: CPT | Mod: NC

## 2025-04-07 ENCOUNTER — RX RENEWAL (OUTPATIENT)
Age: 60
End: 2025-04-07

## 2025-06-02 ENCOUNTER — NON-APPOINTMENT (OUTPATIENT)
Age: 60
End: 2025-06-02

## 2025-06-02 ENCOUNTER — APPOINTMENT (OUTPATIENT)
Dept: HEART AND VASCULAR | Facility: CLINIC | Age: 60
End: 2025-06-02
Payer: COMMERCIAL

## 2025-06-02 VITALS
TEMPERATURE: 96.7 F | WEIGHT: 220 LBS | DIASTOLIC BLOOD PRESSURE: 67 MMHG | BODY MASS INDEX: 27.35 KG/M2 | HEART RATE: 70 BPM | HEIGHT: 75 IN | SYSTOLIC BLOOD PRESSURE: 135 MMHG

## 2025-06-02 PROCEDURE — 93000 ELECTROCARDIOGRAM COMPLETE: CPT

## 2025-06-02 PROCEDURE — 99213 OFFICE O/P EST LOW 20 MIN: CPT

## 2025-06-02 PROCEDURE — G2211 COMPLEX E/M VISIT ADD ON: CPT | Mod: NC

## 2025-06-25 ENCOUNTER — NON-APPOINTMENT (OUTPATIENT)
Age: 60
End: 2025-06-25

## 2025-06-25 ENCOUNTER — APPOINTMENT (OUTPATIENT)
Dept: HEART AND VASCULAR | Facility: CLINIC | Age: 60
End: 2025-06-25
Payer: COMMERCIAL

## 2025-06-25 VITALS
BODY MASS INDEX: 29.47 KG/M2 | DIASTOLIC BLOOD PRESSURE: 67 MMHG | OXYGEN SATURATION: 95 % | HEIGHT: 75 IN | TEMPERATURE: 97.6 F | HEART RATE: 87 BPM | WEIGHT: 237 LBS | SYSTOLIC BLOOD PRESSURE: 123 MMHG

## 2025-06-25 PROCEDURE — 36415 COLL VENOUS BLD VENIPUNCTURE: CPT

## 2025-06-25 PROCEDURE — 93000 ELECTROCARDIOGRAM COMPLETE: CPT

## 2025-06-25 PROCEDURE — 99214 OFFICE O/P EST MOD 30 MIN: CPT

## 2025-06-25 RX ORDER — LOSARTAN POTASSIUM 25 MG/1
25 TABLET, FILM COATED ORAL DAILY
Qty: 90 | Refills: 1 | Status: ACTIVE | COMMUNITY
Start: 2025-06-25 | End: 1900-01-01

## 2025-06-26 ENCOUNTER — TRANSCRIPTION ENCOUNTER (OUTPATIENT)
Age: 60
End: 2025-06-26

## 2025-06-26 LAB
ALBUMIN SERPL ELPH-MCNC: 4.1 G/DL
ALP BLD-CCNC: 44 U/L
ALT SERPL-CCNC: 50 U/L
ANION GAP SERPL CALC-SCNC: 15 MMOL/L
AST SERPL-CCNC: 43 U/L
BASOPHILS # BLD AUTO: 0.04 K/UL
BASOPHILS NFR BLD AUTO: 0.8 %
BILIRUB SERPL-MCNC: 0.7 MG/DL
BUN SERPL-MCNC: 15 MG/DL
CALCIUM SERPL-MCNC: 9.6 MG/DL
CHLORIDE SERPL-SCNC: 106 MMOL/L
CHOLEST SERPL-MCNC: 160 MG/DL
CO2 SERPL-SCNC: 19 MMOL/L
CREAT SERPL-MCNC: 0.85 MG/DL
CREAT SPEC-SCNC: 207 MG/DL
EGFRCR SERPLBLD CKD-EPI 2021: 101 ML/MIN/1.73M2
EOSINOPHIL # BLD AUTO: 0.21 K/UL
EOSINOPHIL NFR BLD AUTO: 4.4 %
ESTIMATED AVERAGE GLUCOSE: 143 MG/DL
GLUCOSE SERPL-MCNC: 212 MG/DL
HBA1C MFR BLD HPLC: 6.6 %
HCT VFR BLD CALC: 46.1 %
HDLC SERPL-MCNC: 62 MG/DL
HGB BLD-MCNC: 15.3 G/DL
IMM GRANULOCYTES NFR BLD AUTO: 0.4 %
LDLC SERPL-MCNC: 80 MG/DL
LYMPHOCYTES # BLD AUTO: 1.2 K/UL
LYMPHOCYTES NFR BLD AUTO: 25.4 %
MAGNESIUM SERPL-MCNC: 1.9 MG/DL
MAN DIFF?: NORMAL
MCHC RBC-ENTMCNC: 31.4 PG
MCHC RBC-ENTMCNC: 33.2 G/DL
MCV RBC AUTO: 94.7 FL
MICROALBUMIN 24H UR DL<=1MG/L-MCNC: 2.3 MG/DL
MICROALBUMIN/CREAT 24H UR-RTO: 11 MG/G
MONOCYTES # BLD AUTO: 0.44 K/UL
MONOCYTES NFR BLD AUTO: 9.3 %
NEUTROPHILS # BLD AUTO: 2.81 K/UL
NEUTROPHILS NFR BLD AUTO: 59.7 %
NONHDLC SERPL-MCNC: 98 MG/DL
PLATELET # BLD AUTO: 193 K/UL
POTASSIUM SERPL-SCNC: 4.2 MMOL/L
PROT SERPL-MCNC: 6.9 G/DL
RBC # BLD: 4.87 M/UL
RBC # FLD: 14.3 %
SODIUM SERPL-SCNC: 141 MMOL/L
TRIGL SERPL-MCNC: 103 MG/DL
TSH SERPL-ACNC: 1.42 UIU/ML
WBC # FLD AUTO: 4.72 K/UL

## 2025-06-30 NOTE — DISCUSSION/SUMMARY
VT w/ATP     * Episode occurred on Jun 28, 2025 at 8:54 AM   -EGM shows 7 seconds of VT at 133 bpm, resolved with 1 burst of ATP. No HV therapy delivered.     * Episode occurred on Jun 29, 2025 at 8:55 AM   -EGM shows 7 seconds of VT at 136 bpm, resolved with 1 burst of ATP. Resulting rhythm is AS/BP at 95 bpm. No HV therapy delivered.     Triage RN call placed to patient for 2 Murj dockets for 2 different VT events with successful ATP x 1 that occurred on 6/28/25 and 6/29/25 approx 8:54 and 8:55am.  Patient states on both events, he had a feeling his heart racing, did not feel right. felt a bit queasy. no Chest Pain or heaviness, no syncope, symptoms occurred during rest. resolving less than 7-10 secs. Lately states he does feel some abdominal fullness and resumed his lasix at 40 mg, last HF in clinic visit, was decreased to 20 mg for symptoms he was having.. He felt no change with symptoms with reducing to 20 mg. States his weight in 3 days went up from 265 to 268lbs, states today he feels less abdominal fullness but has not checked his WT since resuming 40 mg.     Weekend V/S checks Saturday AM: BP:148/84 P:70, 139/80 P:71. Sunday BP:149/92, P:70 bpm, 139/83. HR 72 bpm. Todays /80. P:70. All checked at 9 am and 9 pm before medications.       -[place]: CRT-D  -Implanted: 06/20/2022  -Indication: VT/VF  -Folllowing: Dr. Wu        Sent to [facility]      [Patient] : the patient [___ Month(s)] : in [unfilled] month(s) [FreeTextEntry1] : 57 y/o male with h/o iddm, overweight, lung nodules, mod AI, htn who presents for f/up today.  -ordered CV MRI given concern for sarcoid, continued CP -serial echo yearly to f/up moderate AI -Echo 11/23 1. Left ventricular cavity is normal. Left ventricular wall thickness is normal. Left ventricular systolic function is normal with an ejection fraction visually estimated at 60 %. There are no regional wall motion abnormalities seen. 2. Normal right ventricular cavity size, wall thickness, and systolic function. 3. Normal atria. 4. No pericardial effusion seen. 5. Moderate aortic regurgitation. 6. Severe thickening of the right coronary cusp of the aortic valve. -ekg ordered today - nsr, normal intervals, no st/t changes -labs 2023 reviewed -continue norvasc 5 mg qd given htn -start statin lipitor 10 mg qhs given dm and elevated LpA -continue insulin, endo f/up -CTA cor 2022: Cardiac: 1. The calcium score is 0 Agatston units. 2. Angiographically normal coronary arteries Non-cardiac: Subpleural solid perilymphatic/centrilobular micronodules predominantly in the upper lung zones measuring up to 5 mm in the left upper lobe. Incompletely visualized small nonenlarged calcified right perihilar lymph nodes. Abbreviated differential includes pulmonary sarcoidosis. An HRCT to include end inspiration/end expiration imaging is suggested for more complete evaluation. -CT chest 2022: Impression: 1. Since 6/2/2022, there are again multiple small lung nodules with a perilymphatic distribution bilaterally in addition to mild symmetric thoracic lymphadenopathy. These findings are typical of sarcoidosis. 2. Hepatic steatosis. There are two liver lesions which may represent focal sparing from fatty infiltration, but liver MRI is recommended to rule out a liver mass. 3. Cholelithiasis. 4. Small splenic calcifications may be related to sarcoidosis. Heart and pericardium: Heart size is normal. No pericardial effusion. The aortic valve is mildly calcified. Vessels: Mild coronary artery calcification. -pulm evaluation for sarcoid - possible bronch -f/up w GI for abnl lft, liver mass, possible gerd, fatty liver, now on PPI -will need lipids 3 months -counseled on cvd risk factors, etoh moderation -f/up 2-3 months for htn, lipids  I have spent 30 minutes reviewing labs, records ,tests and discussed cvd risk factors, cp, lung nodules, htn, cp evaluation . [EKG obtained to assist in diagnosis and management of assessed problem(s)] : EKG obtained to assist in diagnosis and management of assessed problem(s)

## 2025-07-08 ENCOUNTER — APPOINTMENT (OUTPATIENT)
Dept: HEART AND VASCULAR | Facility: CLINIC | Age: 60
End: 2025-07-08
Payer: COMMERCIAL

## 2025-07-08 VITALS
OXYGEN SATURATION: 95 % | HEART RATE: 68 BPM | HEIGHT: 75 IN | SYSTOLIC BLOOD PRESSURE: 144 MMHG | BODY MASS INDEX: 29.22 KG/M2 | TEMPERATURE: 97.5 F | DIASTOLIC BLOOD PRESSURE: 86 MMHG | WEIGHT: 235 LBS

## 2025-07-08 VITALS — DIASTOLIC BLOOD PRESSURE: 74 MMHG | SYSTOLIC BLOOD PRESSURE: 140 MMHG

## 2025-07-08 PROCEDURE — 36415 COLL VENOUS BLD VENIPUNCTURE: CPT

## 2025-07-08 PROCEDURE — 99213 OFFICE O/P EST LOW 20 MIN: CPT | Mod: 25

## 2025-07-08 PROCEDURE — 93970 EXTREMITY STUDY: CPT

## 2025-07-08 PROCEDURE — 93306 TTE W/DOPPLER COMPLETE: CPT

## 2025-07-09 ENCOUNTER — TRANSCRIPTION ENCOUNTER (OUTPATIENT)
Age: 60
End: 2025-07-09

## 2025-07-09 ENCOUNTER — RX RENEWAL (OUTPATIENT)
Age: 60
End: 2025-07-09

## 2025-07-09 PROBLEM — R60.0 LEG EDEMA: Status: ACTIVE | Noted: 2025-06-25

## 2025-07-09 LAB
ANION GAP SERPL CALC-SCNC: 13 MMOL/L
BUN SERPL-MCNC: 13 MG/DL
CALCIUM SERPL-MCNC: 9.6 MG/DL
CHLORIDE SERPL-SCNC: 104 MMOL/L
CO2 SERPL-SCNC: 21 MMOL/L
CREAT SERPL-MCNC: 0.88 MG/DL
EGFRCR SERPLBLD CKD-EPI 2021: 100 ML/MIN/1.73M2
GLUCOSE SERPL-MCNC: 155 MG/DL
POTASSIUM SERPL-SCNC: 4.4 MMOL/L
SODIUM SERPL-SCNC: 138 MMOL/L

## 2025-07-09 RX ORDER — HYDROCHLOROTHIAZIDE 12.5 MG/1
12.5 TABLET ORAL
Qty: 90 | Refills: 1 | Status: ACTIVE | COMMUNITY
Start: 2025-07-09 | End: 1900-01-01

## 2025-07-11 ENCOUNTER — TRANSCRIPTION ENCOUNTER (OUTPATIENT)
Age: 60
End: 2025-07-11

## 2025-09-10 ENCOUNTER — TRANSCRIPTION ENCOUNTER (OUTPATIENT)
Age: 60
End: 2025-09-10

## 2025-09-12 ENCOUNTER — NON-APPOINTMENT (OUTPATIENT)
Age: 60
End: 2025-09-12

## 2025-09-12 ENCOUNTER — TRANSCRIPTION ENCOUNTER (OUTPATIENT)
Age: 60
End: 2025-09-12